# Patient Record
Sex: FEMALE | Race: WHITE | NOT HISPANIC OR LATINO | URBAN - METROPOLITAN AREA
[De-identification: names, ages, dates, MRNs, and addresses within clinical notes are randomized per-mention and may not be internally consistent; named-entity substitution may affect disease eponyms.]

---

## 2018-01-30 ENCOUNTER — INPATIENT (INPATIENT)
Facility: HOSPITAL | Age: 83
LOS: 3 days | Discharge: HOME | DRG: 195 | End: 2018-02-03
Attending: GENERAL PRACTICE | Admitting: GENERAL PRACTICE
Payer: COMMERCIAL

## 2018-01-30 VITALS
SYSTOLIC BLOOD PRESSURE: 158 MMHG | DIASTOLIC BLOOD PRESSURE: 82 MMHG | TEMPERATURE: 101 F | HEIGHT: 64 IN | OXYGEN SATURATION: 100 % | RESPIRATION RATE: 18 BRPM | WEIGHT: 154.98 LBS | HEART RATE: 82 BPM

## 2018-01-30 DIAGNOSIS — I10 ESSENTIAL (PRIMARY) HYPERTENSION: ICD-10-CM

## 2018-01-30 DIAGNOSIS — J18.9 PNEUMONIA, UNSPECIFIED ORGANISM: ICD-10-CM

## 2018-01-30 DIAGNOSIS — Z29.9 ENCOUNTER FOR PROPHYLACTIC MEASURES, UNSPECIFIED: ICD-10-CM

## 2018-01-30 LAB
ANION GAP SERPL CALC-SCNC: 5 MMOL/L — SIGNIFICANT CHANGE UP (ref 5–17)
BASOPHILS # BLD AUTO: 0.1 K/UL — SIGNIFICANT CHANGE UP (ref 0–0.2)
BASOPHILS NFR BLD AUTO: 1.2 % — SIGNIFICANT CHANGE UP (ref 0–2)
BUN SERPL-MCNC: 27 MG/DL — HIGH (ref 7–18)
CALCIUM SERPL-MCNC: 8.8 MG/DL — SIGNIFICANT CHANGE UP (ref 8.4–10.5)
CHLORIDE SERPL-SCNC: 98 MMOL/L — SIGNIFICANT CHANGE UP (ref 96–108)
CO2 SERPL-SCNC: 31 MMOL/L — SIGNIFICANT CHANGE UP (ref 22–31)
CREAT SERPL-MCNC: 1.04 MG/DL — SIGNIFICANT CHANGE UP (ref 0.5–1.3)
EOSINOPHIL # BLD AUTO: 0 K/UL — SIGNIFICANT CHANGE UP (ref 0–0.5)
EOSINOPHIL NFR BLD AUTO: 0.1 % — SIGNIFICANT CHANGE UP (ref 0–6)
GLUCOSE SERPL-MCNC: 160 MG/DL — HIGH (ref 70–99)
HCT VFR BLD CALC: 47.8 % — HIGH (ref 34.5–45)
HGB BLD-MCNC: 15.7 G/DL — HIGH (ref 11.5–15.5)
LACTATE SERPL-SCNC: 1 MMOL/L — SIGNIFICANT CHANGE UP (ref 0.7–2)
LYMPHOCYTES # BLD AUTO: 1.5 K/UL — SIGNIFICANT CHANGE UP (ref 1–3.3)
LYMPHOCYTES # BLD AUTO: 13.1 % — SIGNIFICANT CHANGE UP (ref 13–44)
MCHC RBC-ENTMCNC: 30.7 PG — SIGNIFICANT CHANGE UP (ref 27–34)
MCHC RBC-ENTMCNC: 32.9 GM/DL — SIGNIFICANT CHANGE UP (ref 32–36)
MCV RBC AUTO: 93.4 FL — SIGNIFICANT CHANGE UP (ref 80–100)
MONOCYTES # BLD AUTO: 1.3 K/UL — HIGH (ref 0–0.9)
MONOCYTES NFR BLD AUTO: 11.2 % — SIGNIFICANT CHANGE UP (ref 2–14)
NEUTROPHILS # BLD AUTO: 8.8 K/UL — HIGH (ref 1.8–7.4)
NEUTROPHILS NFR BLD AUTO: 74.4 % — SIGNIFICANT CHANGE UP (ref 43–77)
PLATELET # BLD AUTO: 155 K/UL — SIGNIFICANT CHANGE UP (ref 150–400)
POTASSIUM SERPL-MCNC: 3.8 MMOL/L — SIGNIFICANT CHANGE UP (ref 3.5–5.3)
POTASSIUM SERPL-SCNC: 3.8 MMOL/L — SIGNIFICANT CHANGE UP (ref 3.5–5.3)
RBC # BLD: 5.12 M/UL — SIGNIFICANT CHANGE UP (ref 3.8–5.2)
RBC # FLD: 12.4 % — SIGNIFICANT CHANGE UP (ref 10.3–14.5)
SODIUM SERPL-SCNC: 134 MMOL/L — LOW (ref 135–145)
WBC # BLD: 11.8 K/UL — HIGH (ref 3.8–10.5)
WBC # FLD AUTO: 11.8 K/UL — HIGH (ref 3.8–10.5)

## 2018-01-30 PROCEDURE — 99285 EMERGENCY DEPT VISIT HI MDM: CPT

## 2018-01-30 PROCEDURE — 71046 X-RAY EXAM CHEST 2 VIEWS: CPT | Mod: 26

## 2018-01-30 RX ORDER — INSULIN LISPRO 100/ML
VIAL (ML) SUBCUTANEOUS
Qty: 0 | Refills: 0 | Status: DISCONTINUED | OUTPATIENT
Start: 2018-01-30 | End: 2018-02-03

## 2018-01-30 RX ORDER — SODIUM CHLORIDE 9 MG/ML
1000 INJECTION INTRAMUSCULAR; INTRAVENOUS; SUBCUTANEOUS
Qty: 0 | Refills: 0 | Status: DISCONTINUED | OUTPATIENT
Start: 2018-01-30 | End: 2018-02-03

## 2018-01-30 RX ORDER — IBUPROFEN 200 MG
1 TABLET ORAL
Qty: 0 | Refills: 0 | COMMUNITY

## 2018-01-30 RX ORDER — CEFTRIAXONE 500 MG/1
1 INJECTION, POWDER, FOR SOLUTION INTRAMUSCULAR; INTRAVENOUS EVERY 24 HOURS
Qty: 0 | Refills: 0 | Status: DISCONTINUED | OUTPATIENT
Start: 2018-01-30 | End: 2018-01-31

## 2018-01-30 RX ORDER — ACETAMINOPHEN 500 MG
650 TABLET ORAL EVERY 6 HOURS
Qty: 0 | Refills: 0 | Status: DISCONTINUED | OUTPATIENT
Start: 2018-01-30 | End: 2018-02-03

## 2018-01-30 RX ORDER — CEFTRIAXONE 500 MG/1
1 INJECTION, POWDER, FOR SOLUTION INTRAMUSCULAR; INTRAVENOUS ONCE
Qty: 0 | Refills: 0 | Status: COMPLETED | OUTPATIENT
Start: 2018-01-30 | End: 2018-01-30

## 2018-01-30 RX ORDER — METOPROLOL TARTRATE 50 MG
1 TABLET ORAL
Qty: 0 | Refills: 0 | COMMUNITY

## 2018-01-30 RX ORDER — ALBUTEROL 90 UG/1
1 AEROSOL, METERED ORAL EVERY 4 HOURS
Qty: 0 | Refills: 0 | Status: DISCONTINUED | OUTPATIENT
Start: 2018-01-30 | End: 2018-02-03

## 2018-01-30 RX ORDER — LISINOPRIL 2.5 MG/1
20 TABLET ORAL
Qty: 0 | Refills: 0 | Status: DISCONTINUED | OUTPATIENT
Start: 2018-01-30 | End: 2018-02-03

## 2018-01-30 RX ORDER — IBUPROFEN 200 MG
200 TABLET ORAL EVERY 6 HOURS
Qty: 0 | Refills: 0 | Status: DISCONTINUED | OUTPATIENT
Start: 2018-01-30 | End: 2018-02-03

## 2018-01-30 RX ORDER — TRAMADOL HYDROCHLORIDE 50 MG/1
1 TABLET ORAL
Qty: 0 | Refills: 0 | COMMUNITY

## 2018-01-30 RX ORDER — PANTOPRAZOLE SODIUM 20 MG/1
40 TABLET, DELAYED RELEASE ORAL
Qty: 0 | Refills: 0 | Status: DISCONTINUED | OUTPATIENT
Start: 2018-01-30 | End: 2018-02-03

## 2018-01-30 RX ORDER — BENZOCAINE AND MENTHOL 5; 1 G/100ML; G/100ML
1 LIQUID ORAL EVERY 4 HOURS
Qty: 0 | Refills: 0 | Status: DISCONTINUED | OUTPATIENT
Start: 2018-01-30 | End: 2018-02-03

## 2018-01-30 RX ORDER — AZITHROMYCIN 500 MG/1
500 TABLET, FILM COATED ORAL EVERY 24 HOURS
Qty: 0 | Refills: 0 | Status: DISCONTINUED | OUTPATIENT
Start: 2018-01-30 | End: 2018-02-03

## 2018-01-30 RX ORDER — ACETAMINOPHEN 500 MG
650 TABLET ORAL ONCE
Qty: 0 | Refills: 0 | Status: COMPLETED | OUTPATIENT
Start: 2018-01-30 | End: 2018-01-30

## 2018-01-30 RX ORDER — TRAMADOL HYDROCHLORIDE 50 MG/1
50 TABLET ORAL THREE TIMES A DAY
Qty: 0 | Refills: 0 | Status: DISCONTINUED | OUTPATIENT
Start: 2018-01-30 | End: 2018-02-03

## 2018-01-30 RX ORDER — AZITHROMYCIN 500 MG/1
500 TABLET, FILM COATED ORAL ONCE
Qty: 0 | Refills: 0 | Status: COMPLETED | OUTPATIENT
Start: 2018-01-30 | End: 2018-01-30

## 2018-01-30 RX ORDER — ENOXAPARIN SODIUM 100 MG/ML
40 INJECTION SUBCUTANEOUS DAILY
Qty: 0 | Refills: 0 | Status: DISCONTINUED | OUTPATIENT
Start: 2018-01-30 | End: 2018-02-03

## 2018-01-30 RX ORDER — TIOTROPIUM BROMIDE 18 UG/1
1 CAPSULE ORAL; RESPIRATORY (INHALATION) DAILY
Qty: 0 | Refills: 0 | Status: DISCONTINUED | OUTPATIENT
Start: 2018-01-30 | End: 2018-02-03

## 2018-01-30 RX ORDER — IPRATROPIUM/ALBUTEROL SULFATE 18-103MCG
3 AEROSOL WITH ADAPTER (GRAM) INHALATION EVERY 6 HOURS
Qty: 0 | Refills: 0 | Status: DISCONTINUED | OUTPATIENT
Start: 2018-01-30 | End: 2018-02-03

## 2018-01-30 RX ORDER — SODIUM CHLORIDE 9 MG/ML
1000 INJECTION INTRAMUSCULAR; INTRAVENOUS; SUBCUTANEOUS ONCE
Qty: 0 | Refills: 0 | Status: COMPLETED | OUTPATIENT
Start: 2018-01-30 | End: 2018-01-30

## 2018-01-30 RX ORDER — LISINOPRIL 2.5 MG/1
1 TABLET ORAL
Qty: 0 | Refills: 0 | COMMUNITY

## 2018-01-30 RX ORDER — METOPROLOL TARTRATE 50 MG
100 TABLET ORAL DAILY
Qty: 0 | Refills: 0 | Status: DISCONTINUED | OUTPATIENT
Start: 2018-01-30 | End: 2018-02-03

## 2018-01-30 RX ADMIN — Medication 650 MILLIGRAM(S): at 22:35

## 2018-01-30 RX ADMIN — SODIUM CHLORIDE 1000 MILLILITER(S): 9 INJECTION INTRAMUSCULAR; INTRAVENOUS; SUBCUTANEOUS at 22:34

## 2018-01-30 RX ADMIN — AZITHROMYCIN 250 MILLIGRAM(S): 500 TABLET, FILM COATED ORAL at 22:36

## 2018-01-30 NOTE — H&P ADULT - HISTORY OF PRESENT ILLNESS
Pt offered official  but prefers daughter translate (Waldemar).   Patient is a 83 y/o F pt with PMhx of HTN, arthritis, lichen planus in foor (uses topical steroids, daughter does not know the name of ointment, will bring in the tube tomorrow), lives with daughter and walks with a walker, AAO*3 at baseline,  presents to ED c/o flu-like symptoms since yesterday. She c/o subjective fever, non productive cough, bodyaches, flushed face, dry mouth, gen bodyaches, weakness, chills. Pt has sick contact in daughter also w/ flu-like symptoms since last week. Jessetr says that yesterday patient was so week, and almost fell to the ground and daughter had to lift her up. has had poor appetite sicne yesterday, ROS negative except above  NKDA, Fhx of lung Ca in brother, Surgical Hx of B/L TKR, never smoker/drinker  [facility] d/w daughter at [facility], patient is fullcode Pt offered official  but prefers daughter translate (Waldemar).   Patient is a 83 y/o F pt with PMhx of HTN, arthritis, lichen planus in foor (uses topical steroids, daughter does not know the name of ointment, will bring in the tube tomorrow), lives with daughter and walks with a walker, AAO*3 at baseline,  presents to ED c/o flu-like symptoms since yesterday. She c/o subjective fever, non productive cough, bodyaches, flushed face, dry mouth, gen bodyaches, weakness, chills. Pt has sick contact in daughter also w/ flu-like symptoms since last week. Jessetr says that yesterday patient was so week, and almost fell to the ground and daughter had to lift her up. has had poor appetite sicne yesterday, ROS negative except above  NKDA, Fhx of lung Ca in brother, Surgical Hx of B/L TKR, never smoker/drinker  [facility] d/w daughter at [facility], patient is full code Pt offered official  but prefers daughter translate (Waldemar).   Patient is a 83 y/o F pt with PMhx of HTN, arthritis, lichen planus in foor (uses topical steroids, daughter does not know the name of ointment, will bring in the tube tomorrow), lives with daughter and walks with a walker, AAO*3 at baseline,  presents to ED c/o flu-like symptoms since yesterday. She c/o subjective fever, non productive cough, bodyaches, flushed face, dry mouth, gen bodyaches, weakness, chills. Pt has sick contact in daughter also w/ flu-like symptoms since last week. Jessetr says that yesterday patient was so week, and almost fell to the ground and daughter had to lift her up. has had poor appetite sicne yesterday, Did not take flu shot.   ROS negative except above  NKDA, Fhx of lung Ca in brother, Surgical Hx of B/L TKR, never smoker/drinker  [facility] d/w daughter at [facility], patient is full code

## 2018-01-30 NOTE — H&P ADULT - PROBLEM SELECTOR PLAN 1
Possible infiltrate on CXR however pending official report, will get CT Chest  s/p ceftriaxone+azithromycin+1L NS bolus in the ED, will c/w Abx, maintenance IVF, Lactate is WNL, need to send RVP, will give 1 dose of tamiflu before RVP is resulted  Patient has diffuse expiratory wheezing on exam  O2, nebs, Robitussin, Cepacol

## 2018-01-30 NOTE — ED PROVIDER NOTE - PROGRESS NOTE DETAILS
CXR with ?infiltrate.  pt reassessed, still feeling weak.  will admit for further treatment.  Blood cultures taken prior to abx.  pt treated for community acquired pneumonia with ceftriaxone and zithromax.

## 2018-01-30 NOTE — H&P ADULT - ASSESSMENT
Patient is a 83 y/o F pt with PMhx of HTN, arthritis, lichen planus in foor (uses topical steroids, daughter does not know the name of ointment, will bring in the tube tomorrow), lives with daughter and walks with a walker, AAO*3 at baseline,  presents to ED c/o flu-like symptoms since yesterday.    Patient has a low grade fever of 100.7 in the ED, BP: 158/82, RR status stable, able to speak in full sentences and O2 sat 100%, Has diffuse expiratory wheezing on exam. no leucocytosis, however hemoconcentrated, H/h of 15.7/47.8, mild hyponatremia of 134, elevated BUN of 27, elevated BS: 160, alctate WNL, s/p ceftriaxone+azithromycin in the ED and 1 L NS bolus. CXR, pending official read, questionable RLL infiltrate  Admitted for flu like symptoms, RVP pending, v/s PNA

## 2018-01-30 NOTE — H&P ADULT - NSHPPHYSICALEXAM_GEN_ALL_CORE
GENERAL: elderly female in no acute distress, comfortable  HEAD: atraumatic, normocephalic   EYES: PERRLA, white sclera.   ENMT: nasal mucosa- Oral cavity- dry  NECK: supple, no JVD, thyroid- not palpable  LYMPH: no palpable lymph nodes     SKIN:  warm and dry  CHEST/LUNG: diffuse expiratory wheezing  HEART: RRR, no m/r/g   ABDOMEN: soft, nontender, nondistended; bowel sounds+  EXTREMITIES: No pedal edema, mild ankle puffiness no cyanosis, no clubbing.  NEURO: AA0X3 , mood/ affect-stable  , no focal neuro deficits

## 2018-01-30 NOTE — ED PROVIDER NOTE - OBJECTIVE STATEMENT
Pt offered official  but prefers daughter translate (Spanish). 81 y/o F pt w/ no significant PMHx presents to ED c/o flu-like symptoms since yesterday. Pt has sick contact in daughter also w/ flu-like symptoms since last week. In ED pt w/ fever, cough and myalgias. NKDA.

## 2018-01-30 NOTE — ED ADULT NURSE NOTE - ED STAT RN HANDOFF DETAILS 2
report given to FARRUKH Finch in holding area in stable condition for continuation of care. pt a&ox3, admitted for PNA. droplet isolation for flu. 20G LAC.

## 2018-01-31 LAB
24R-OH-CALCIDIOL SERPL-MCNC: 20.9 NG/ML — LOW (ref 30–80)
ALBUMIN SERPL ELPH-MCNC: 3.3 G/DL — LOW (ref 3.5–5)
ALP SERPL-CCNC: 41 U/L — SIGNIFICANT CHANGE UP (ref 40–120)
ALT FLD-CCNC: 16 U/L DA — SIGNIFICANT CHANGE UP (ref 10–60)
ANION GAP SERPL CALC-SCNC: 11 MMOL/L — SIGNIFICANT CHANGE UP (ref 5–17)
AST SERPL-CCNC: 25 U/L — SIGNIFICANT CHANGE UP (ref 10–40)
BASOPHILS # BLD AUTO: 0.1 K/UL — SIGNIFICANT CHANGE UP (ref 0–0.2)
BASOPHILS NFR BLD AUTO: 1.1 % — SIGNIFICANT CHANGE UP (ref 0–2)
BILIRUB SERPL-MCNC: 0.4 MG/DL — SIGNIFICANT CHANGE UP (ref 0.2–1.2)
BUN SERPL-MCNC: 21 MG/DL — HIGH (ref 7–18)
CALCIUM SERPL-MCNC: 8.5 MG/DL — SIGNIFICANT CHANGE UP (ref 8.4–10.5)
CHLORIDE SERPL-SCNC: 100 MMOL/L — SIGNIFICANT CHANGE UP (ref 96–108)
CHOLEST SERPL-MCNC: 153 MG/DL — SIGNIFICANT CHANGE UP (ref 10–199)
CO2 SERPL-SCNC: 26 MMOL/L — SIGNIFICANT CHANGE UP (ref 22–31)
CREAT SERPL-MCNC: 0.89 MG/DL — SIGNIFICANT CHANGE UP (ref 0.5–1.3)
EOSINOPHIL # BLD AUTO: 0 K/UL — SIGNIFICANT CHANGE UP (ref 0–0.5)
EOSINOPHIL NFR BLD AUTO: 0.2 % — SIGNIFICANT CHANGE UP (ref 0–6)
FLUAV H1 2009 PAND RNA SPEC QL NAA+PROBE: DETECTED
FOLATE SERPL-MCNC: 6.2 NG/ML — SIGNIFICANT CHANGE UP (ref 4.8–24.2)
GLUCOSE BLDC GLUCOMTR-MCNC: 101 MG/DL — HIGH (ref 70–99)
GLUCOSE BLDC GLUCOMTR-MCNC: 123 MG/DL — HIGH (ref 70–99)
GLUCOSE BLDC GLUCOMTR-MCNC: 125 MG/DL — HIGH (ref 70–99)
GLUCOSE BLDC GLUCOMTR-MCNC: 138 MG/DL — HIGH (ref 70–99)
GLUCOSE SERPL-MCNC: 109 MG/DL — HIGH (ref 70–99)
HBA1C BLD-MCNC: 6.6 % — HIGH (ref 4–5.6)
HCT VFR BLD CALC: 47.2 % — HIGH (ref 34.5–45)
HDLC SERPL-MCNC: 28 MG/DL — LOW (ref 40–125)
HGB BLD-MCNC: 15.2 G/DL — SIGNIFICANT CHANGE UP (ref 11.5–15.5)
LIPID PNL WITH DIRECT LDL SERPL: 93 MG/DL — SIGNIFICANT CHANGE UP
LYMPHOCYTES # BLD AUTO: 2.6 K/UL — SIGNIFICANT CHANGE UP (ref 1–3.3)
LYMPHOCYTES # BLD AUTO: 25.6 % — SIGNIFICANT CHANGE UP (ref 13–44)
MAGNESIUM SERPL-MCNC: 1.9 MG/DL — SIGNIFICANT CHANGE UP (ref 1.6–2.6)
MCHC RBC-ENTMCNC: 30.4 PG — SIGNIFICANT CHANGE UP (ref 27–34)
MCHC RBC-ENTMCNC: 32.3 GM/DL — SIGNIFICANT CHANGE UP (ref 32–36)
MCV RBC AUTO: 94.2 FL — SIGNIFICANT CHANGE UP (ref 80–100)
MONOCYTES # BLD AUTO: 1.2 K/UL — HIGH (ref 0–0.9)
MONOCYTES NFR BLD AUTO: 12 % — SIGNIFICANT CHANGE UP (ref 2–14)
NEUTROPHILS # BLD AUTO: 6.1 K/UL — SIGNIFICANT CHANGE UP (ref 1.8–7.4)
NEUTROPHILS NFR BLD AUTO: 61.1 % — SIGNIFICANT CHANGE UP (ref 43–77)
PHOSPHATE SERPL-MCNC: 3.2 MG/DL — SIGNIFICANT CHANGE UP (ref 2.5–4.5)
PLATELET # BLD AUTO: 162 K/UL — SIGNIFICANT CHANGE UP (ref 150–400)
POTASSIUM SERPL-MCNC: 3.9 MMOL/L — SIGNIFICANT CHANGE UP (ref 3.5–5.3)
POTASSIUM SERPL-SCNC: 3.9 MMOL/L — SIGNIFICANT CHANGE UP (ref 3.5–5.3)
PROT SERPL-MCNC: 8.2 G/DL — SIGNIFICANT CHANGE UP (ref 6–8.3)
RAPID RVP RESULT: DETECTED
RBC # BLD: 5 M/UL — SIGNIFICANT CHANGE UP (ref 3.8–5.2)
RBC # FLD: 12.3 % — SIGNIFICANT CHANGE UP (ref 10.3–14.5)
SODIUM SERPL-SCNC: 137 MMOL/L — SIGNIFICANT CHANGE UP (ref 135–145)
TOTAL CHOLESTEROL/HDL RATIO MEASUREMENT: 5.5 RATIO — SIGNIFICANT CHANGE UP (ref 3.3–7.1)
TRIGL SERPL-MCNC: 162 MG/DL — HIGH (ref 10–149)
TSH SERPL-MCNC: 2.65 UU/ML — SIGNIFICANT CHANGE UP (ref 0.34–4.82)
VIT B12 SERPL-MCNC: 390 PG/ML — SIGNIFICANT CHANGE UP (ref 232–1245)
WBC # BLD: 10 K/UL — SIGNIFICANT CHANGE UP (ref 3.8–10.5)
WBC # FLD AUTO: 10 K/UL — SIGNIFICANT CHANGE UP (ref 3.8–10.5)

## 2018-01-31 PROCEDURE — 71250 CT THORAX DX C-: CPT | Mod: 26

## 2018-01-31 RX ORDER — CHOLECALCIFEROL (VITAMIN D3) 125 MCG
1000 CAPSULE ORAL DAILY
Qty: 0 | Refills: 0 | Status: DISCONTINUED | OUTPATIENT
Start: 2018-01-31 | End: 2018-02-03

## 2018-01-31 RX ORDER — AMLODIPINE BESYLATE 2.5 MG/1
5 TABLET ORAL DAILY
Qty: 0 | Refills: 0 | Status: DISCONTINUED | OUTPATIENT
Start: 2018-01-31 | End: 2018-02-01

## 2018-01-31 RX ORDER — NYSTATIN CREAM 100000 [USP'U]/G
1 CREAM TOPICAL THREE TIMES A DAY
Qty: 0 | Refills: 0 | Status: DISCONTINUED | OUTPATIENT
Start: 2018-01-31 | End: 2018-02-03

## 2018-01-31 RX ADMIN — Medication 0.1 MILLIGRAM(S): at 05:14

## 2018-01-31 RX ADMIN — Medication 1 APPLICATION(S): at 12:39

## 2018-01-31 RX ADMIN — AMLODIPINE BESYLATE 5 MILLIGRAM(S): 2.5 TABLET ORAL at 22:25

## 2018-01-31 RX ADMIN — Medication 3 MILLILITER(S): at 10:13

## 2018-01-31 RX ADMIN — CEFTRIAXONE 100 GRAM(S): 500 INJECTION, POWDER, FOR SOLUTION INTRAMUSCULAR; INTRAVENOUS at 01:18

## 2018-01-31 RX ADMIN — Medication 30 MILLIGRAM(S): at 05:19

## 2018-01-31 RX ADMIN — Medication 100 MILLIGRAM(S): at 05:53

## 2018-01-31 RX ADMIN — PANTOPRAZOLE SODIUM 40 MILLIGRAM(S): 20 TABLET, DELAYED RELEASE ORAL at 07:58

## 2018-01-31 RX ADMIN — AZITHROMYCIN 250 MILLIGRAM(S): 500 TABLET, FILM COATED ORAL at 22:30

## 2018-01-31 RX ADMIN — ENOXAPARIN SODIUM 40 MILLIGRAM(S): 100 INJECTION SUBCUTANEOUS at 12:39

## 2018-01-31 RX ADMIN — Medication 75 MILLIGRAM(S): at 01:16

## 2018-01-31 RX ADMIN — Medication 30 MILLIGRAM(S): at 18:02

## 2018-01-31 RX ADMIN — LISINOPRIL 20 MILLIGRAM(S): 2.5 TABLET ORAL at 18:03

## 2018-01-31 RX ADMIN — Medication 3 MILLILITER(S): at 05:13

## 2018-01-31 RX ADMIN — Medication 3 MILLILITER(S): at 20:14

## 2018-01-31 RX ADMIN — Medication 0.1 MILLIGRAM(S): at 18:03

## 2018-01-31 RX ADMIN — NYSTATIN CREAM 1 APPLICATION(S): 100000 CREAM TOPICAL at 22:25

## 2018-01-31 RX ADMIN — LISINOPRIL 20 MILLIGRAM(S): 2.5 TABLET ORAL at 05:14

## 2018-01-31 RX ADMIN — Medication 3 MILLILITER(S): at 15:16

## 2018-02-01 ENCOUNTER — TRANSCRIPTION ENCOUNTER (OUTPATIENT)
Age: 83
End: 2018-02-01

## 2018-02-01 LAB
ALBUMIN SERPL ELPH-MCNC: 3.1 G/DL — LOW (ref 3.5–5)
ALP SERPL-CCNC: 66 U/L — SIGNIFICANT CHANGE UP (ref 40–120)
ALT FLD-CCNC: 34 U/L DA — SIGNIFICANT CHANGE UP (ref 10–60)
ANION GAP SERPL CALC-SCNC: 8 MMOL/L — SIGNIFICANT CHANGE UP (ref 5–17)
AST SERPL-CCNC: 43 U/L — HIGH (ref 10–40)
BILIRUB SERPL-MCNC: 0.5 MG/DL — SIGNIFICANT CHANGE UP (ref 0.2–1.2)
BUN SERPL-MCNC: 20 MG/DL — HIGH (ref 7–18)
CALCIUM SERPL-MCNC: 8.9 MG/DL — SIGNIFICANT CHANGE UP (ref 8.4–10.5)
CHLORIDE SERPL-SCNC: 101 MMOL/L — SIGNIFICANT CHANGE UP (ref 96–108)
CO2 SERPL-SCNC: 28 MMOL/L — SIGNIFICANT CHANGE UP (ref 22–31)
CREAT SERPL-MCNC: 0.85 MG/DL — SIGNIFICANT CHANGE UP (ref 0.5–1.3)
GLUCOSE BLDC GLUCOMTR-MCNC: 106 MG/DL — HIGH (ref 70–99)
GLUCOSE BLDC GLUCOMTR-MCNC: 123 MG/DL — HIGH (ref 70–99)
GLUCOSE BLDC GLUCOMTR-MCNC: 134 MG/DL — HIGH (ref 70–99)
GLUCOSE BLDC GLUCOMTR-MCNC: 175 MG/DL — HIGH (ref 70–99)
GLUCOSE SERPL-MCNC: 109 MG/DL — HIGH (ref 70–99)
HCT VFR BLD CALC: 47.4 % — HIGH (ref 34.5–45)
HGB BLD-MCNC: 15.2 G/DL — SIGNIFICANT CHANGE UP (ref 11.5–15.5)
MCHC RBC-ENTMCNC: 29.9 PG — SIGNIFICANT CHANGE UP (ref 27–34)
MCHC RBC-ENTMCNC: 32.1 GM/DL — SIGNIFICANT CHANGE UP (ref 32–36)
MCV RBC AUTO: 93.3 FL — SIGNIFICANT CHANGE UP (ref 80–100)
PLATELET # BLD AUTO: 143 K/UL — LOW (ref 150–400)
POTASSIUM SERPL-MCNC: 4 MMOL/L — SIGNIFICANT CHANGE UP (ref 3.5–5.3)
POTASSIUM SERPL-SCNC: 4 MMOL/L — SIGNIFICANT CHANGE UP (ref 3.5–5.3)
PROT SERPL-MCNC: 8.1 G/DL — SIGNIFICANT CHANGE UP (ref 6–8.3)
RBC # BLD: 5.08 M/UL — SIGNIFICANT CHANGE UP (ref 3.8–5.2)
RBC # FLD: 12.3 % — SIGNIFICANT CHANGE UP (ref 10.3–14.5)
SODIUM SERPL-SCNC: 137 MMOL/L — SIGNIFICANT CHANGE UP (ref 135–145)
WBC # BLD: 6.4 K/UL — SIGNIFICANT CHANGE UP (ref 3.8–10.5)
WBC # FLD AUTO: 6.4 K/UL — SIGNIFICANT CHANGE UP (ref 3.8–10.5)

## 2018-02-01 RX ORDER — AMLODIPINE BESYLATE 2.5 MG/1
10 TABLET ORAL DAILY
Qty: 0 | Refills: 0 | Status: DISCONTINUED | OUTPATIENT
Start: 2018-02-02 | End: 2018-02-03

## 2018-02-01 RX ORDER — AMLODIPINE BESYLATE 2.5 MG/1
5 TABLET ORAL ONCE
Qty: 0 | Refills: 0 | Status: COMPLETED | OUTPATIENT
Start: 2018-02-01 | End: 2018-02-01

## 2018-02-01 RX ADMIN — Medication 0.1 MILLIGRAM(S): at 18:50

## 2018-02-01 RX ADMIN — NYSTATIN CREAM 1 APPLICATION(S): 100000 CREAM TOPICAL at 14:51

## 2018-02-01 RX ADMIN — Medication 30 MILLIGRAM(S): at 06:18

## 2018-02-01 RX ADMIN — Medication 3 MILLILITER(S): at 20:53

## 2018-02-01 RX ADMIN — Medication 100 MILLIGRAM(S): at 18:52

## 2018-02-01 RX ADMIN — Medication 0.1 MILLIGRAM(S): at 06:18

## 2018-02-01 RX ADMIN — NYSTATIN CREAM 1 APPLICATION(S): 100000 CREAM TOPICAL at 23:13

## 2018-02-01 RX ADMIN — Medication 100 MILLIGRAM(S): at 06:18

## 2018-02-01 RX ADMIN — Medication 1000 UNIT(S): at 12:42

## 2018-02-01 RX ADMIN — Medication 30 MILLIGRAM(S): at 18:50

## 2018-02-01 RX ADMIN — LISINOPRIL 20 MILLIGRAM(S): 2.5 TABLET ORAL at 18:50

## 2018-02-01 RX ADMIN — LISINOPRIL 20 MILLIGRAM(S): 2.5 TABLET ORAL at 06:18

## 2018-02-01 RX ADMIN — Medication 3 MILLILITER(S): at 09:07

## 2018-02-01 RX ADMIN — ENOXAPARIN SODIUM 40 MILLIGRAM(S): 100 INJECTION SUBCUTANEOUS at 12:42

## 2018-02-01 RX ADMIN — AZITHROMYCIN 250 MILLIGRAM(S): 500 TABLET, FILM COATED ORAL at 23:12

## 2018-02-01 RX ADMIN — PANTOPRAZOLE SODIUM 40 MILLIGRAM(S): 20 TABLET, DELAYED RELEASE ORAL at 06:18

## 2018-02-01 RX ADMIN — AMLODIPINE BESYLATE 5 MILLIGRAM(S): 2.5 TABLET ORAL at 01:58

## 2018-02-01 RX ADMIN — Medication 3 MILLILITER(S): at 15:18

## 2018-02-01 RX ADMIN — Medication 1 APPLICATION(S): at 18:50

## 2018-02-01 RX ADMIN — NYSTATIN CREAM 1 APPLICATION(S): 100000 CREAM TOPICAL at 06:19

## 2018-02-01 NOTE — PROGRESS NOTE ADULT - ASSESSMENT
Patient is a 81 y/o F pt with PMhx of HTN, arthritis, lichen planus in foot (uses topical steroids, daughter does not know the name of ointment, will bring in the tube tomorrow), lives with daughter and walks with a walker, AAO*3 at baseline,  presents to ED c/o flu-like symptoms.  Admitted for the flu and possible early PNA     Problem/Plan - 1:  ·  Problem: viral URI / influenza with possible early bacterial superinfection/ Pneumonia  continue Tamiflu and azithromycin day 3  O2, nebs, Robitussin, Cepacol.   supportive care     Problem/Plan - 2:  ·  Problem: Hypertension.  Plan: c/w home dose of clonidine, Torpol XL and lisinopril with parameters  DASH diet.     -GI/DVT Prophylaxis.

## 2018-02-01 NOTE — DISCHARGE NOTE ADULT - CARE PLAN
Principal Discharge DX:	Influenza  Goal:	to treat it  Assessment and plan of treatment:	continue Tamiflu for total 5 days and pt treated with azithromycin   encourage oral hydration  f/u with PMD within 1 week  Secondary Diagnosis:	Essential hypertension Principal Discharge DX:	Influenza  Goal:	to treat it  Assessment and plan of treatment:	You have been diagnosed with Flu, You were given Tamiflu in hospital. Continue tamiflu for 2 more days and then stop. Please follow up with your Primary medical doctor    encourage oral hydration  f/u with PMD within 1 week  Secondary Diagnosis:	Essential hypertension  Goal:	<140/80  Assessment and plan of treatment:	Continue with your BP medications at home. adhere to low salt diet. Please follow up with your Primary medical doctor  Secondary Diagnosis:	Pneumonia  Assessment and plan of treatment:	You have been treated for early pneumonia with antibiotic. Continue antibiotic, azithromycin for 2 more days and then stop. Please follow up with your Primary medical doctor Principal Discharge DX:	Influenza  Goal:	to treat it  Assessment and plan of treatment:	You have been diagnosed with Flu, You were given Tamiflu in hospital. Continue tamiflu for 2 more days and then stop. Please follow up with your Primary medical doctor    encourage oral hydration  f/u with PMD within 1 week  Secondary Diagnosis:	Essential hypertension  Goal:	<140/80  Assessment and plan of treatment:	Continue with your BP medications at home. adhere to low salt diet. Please follow up with your Primary medical doctor  Secondary Diagnosis:	Pneumonia  Assessment and plan of treatment:	You have been treated for early pneumonia with antibiotic. Continue antibiotic, azithromycin for 2 more days and then stop. Please follow up with your Primary medical doctor  Secondary Diagnosis:	Wound  Assessment and plan of treatment:	Please do wound care at home and apply nystatin powder to the rash.

## 2018-02-01 NOTE — DISCHARGE NOTE ADULT - PATIENT PORTAL LINK FT
“You can access the [employer] Patient Portal, offered by [facility], by registering with the following website: [email or web address]”

## 2018-02-01 NOTE — DISCHARGE NOTE ADULT - MEDICATION SUMMARY - MEDICATIONS TO TAKE
I will START or STAY ON the medications listed below when I get home from the hospital:    Rolling walker  -- 1   -- Indication: For ambulation    rolling walker  -- Indication: For ambulation    traMADol 50 mg oral tablet  -- 1 tab(s) by mouth 3 times a day  -- Indication: For analgesic    Advil 200 mg oral tablet  -- 1 tab(s) by mouth every 6 hours  -- Indication: For analgesic    lisinopril 20 mg oral tablet  -- 1 tab(s) by mouth 2 times a day  -- Indication: For HTN    cloNIDine 0.1 mg oral tablet  -- 1 tab(s) by mouth 2 times a day  -- Indication: For HTN    oseltamivir 30 mg oral capsule  -- 1 cap(s) by mouth 2 times a day  -- Indication: For FLU    metoprolol succinate 100 mg oral tablet, extended release  -- 1 tab(s) by mouth once a day  -- Indication: For HTN    Zithromax 500 mg oral tablet  -- 1 tab(s) by mouth once a day (after a meal)   -- Do not take dairy products, antacids, or iron preparations within one hour of this medication.  Finish all this medication unless otherwise directed by prescriber.    -- Indication: For PnA I will START or STAY ON the medications listed below when I get home from the hospital:    Rolling walker  -- 1   -- Indication: For ambulation    rolling walker  -- Indication: For ambulation    traMADol 50 mg oral tablet  -- 1 tab(s) by mouth 3 times a day  -- Indication: For analgesic    Advil 200 mg oral tablet  -- 1 tab(s) by mouth every 6 hours  -- Indication: For analgesic    lisinopril 20 mg oral tablet  -- 1 tab(s) by mouth 2 times a day  -- Indication: For HTN    cloNIDine 0.1 mg oral tablet  -- 1 tab(s) by mouth 2 times a day  -- Indication: For Essential hypertension    oseltamivir 30 mg oral capsule  -- 1 cap(s) by mouth 2 times a day  -- Indication: For Influenza    metoprolol succinate 100 mg oral tablet, extended release  -- 1 tab(s) by mouth once a day  -- Indication: For HTN    Zithromax 500 mg oral tablet  -- 1 tab(s) by mouth once a day (after a meal)   -- Do not take dairy products, antacids, or iron preparations within one hour of this medication.  Finish all this medication unless otherwise directed by prescriber.    -- Indication: For Pneumonia

## 2018-02-01 NOTE — DISCHARGE NOTE ADULT - PLAN OF CARE
to treat it continue Tamiflu for total 5 days and pt treated with azithromycin   encourage oral hydration  f/u with PMD within 1 week You have been diagnosed with Flu, You were given Tamiflu in hospital. Continue tamiflu for 2 more days and then stop. Please follow up with your Primary medical doctor    encourage oral hydration  f/u with PMD within 1 week <140/80 Continue with your BP medications at home. adhere to low salt diet. Please follow up with your Primary medical doctor You have been treated for early pneumonia with antibiotic. Continue antibiotic, azithromycin for 2 more days and then stop. Please follow up with your Primary medical doctor Please do wound care at home and apply nystatin powder to the rash.

## 2018-02-01 NOTE — DISCHARGE NOTE ADULT - HOSPITAL COURSE
Patient is a 81 y/o F pt with PMhx of HTN, arthritis, lichen planus in foot (uses topical steroids, daughter does not know the name of ointment, will bring in the tube tomorrow), lives with daughter and walks with a walker, AAO*3 at baseline,  presents to ED c/o flu-like symptoms.  Admitted for the flu and possible early PNA  1. Viral URI / influenza with possible early bacterial superinfection/ Pneumonia  continue Tamiflu for total 5 days and pt treated with azithromycin   O2, nebs, Robitussin, Cepacol.   supportive care and encourage oral hydration    2. Hypertension.: c/w home dose of clonidine, Torpol XL and lisinopril   DASH diet.     Pt is stable to be discharged as per Attending Discharge summary by covering resident:     Patient is a 83 y/o F pt with PMhx of HTN, arthritis, lichen planus in foot (uses topical steroids, daughter does not know the name of ointment, will bring in the tube tomorrow), lives with daughter and walks with a walker, AAO*3 at baseline,  presents to ED c/o flu-like symptoms.  Admitted for the flu and possible early PNA  1. Viral URI / influenza with possible early bacterial superinfection/ Pneumonia  continue Tamiflu for total 5 days and pt treated with azithromycin   O2, nebs, Robitussin, Cepacol.   supportive care and encourage oral hydration    2. Hypertension.: c/w home dose of clonidine, Torpol XL and lisinopril   DASH diet.     Pt is stable to be discharged as per Attending

## 2018-02-01 NOTE — DISCHARGE NOTE ADULT - ADDITIONAL INSTRUCTIONS
continue Tamiflu for total 5 days and pt treated with azithromycin   encourage oral hydration  f/u with PMD within 1 week continue Tamiflu for total 5 days and pt treated with azithromycin , Continue azithromycin for 2 days  encourage oral hydration  f/u with PMD within 1 week

## 2018-02-01 NOTE — DIETITIAN INITIAL EVALUATION ADULT. - OTHER INFO
Nutrition consult requested for assessment & education. Patient from home lives with daughter. Visited pt. debilitated, poor historian, contacted daughter but connected to voice mail presently. Per PCA pt. not eating so well, consumed 40% of breakfast meals & tolerating, also observed tray, encourage po intake with assistance feeding, foot/wound care noted. D/W RN/MD.

## 2018-02-01 NOTE — DIETITIAN INITIAL EVALUATION ADULT. - NUTRITION INTERVENTION
Mineral/Medical Food Supplements/Meals and Snack/Vitamin/Collaboration and Referral of Nutrition Care

## 2018-02-02 LAB
ANION GAP SERPL CALC-SCNC: 11 MMOL/L — SIGNIFICANT CHANGE UP (ref 5–17)
BUN SERPL-MCNC: 36 MG/DL — HIGH (ref 7–18)
CALCIUM SERPL-MCNC: 9.1 MG/DL — SIGNIFICANT CHANGE UP (ref 8.4–10.5)
CHLORIDE SERPL-SCNC: 102 MMOL/L — SIGNIFICANT CHANGE UP (ref 96–108)
CO2 SERPL-SCNC: 26 MMOL/L — SIGNIFICANT CHANGE UP (ref 22–31)
CREAT SERPL-MCNC: 1.11 MG/DL — SIGNIFICANT CHANGE UP (ref 0.5–1.3)
GLUCOSE BLDC GLUCOMTR-MCNC: 115 MG/DL — HIGH (ref 70–99)
GLUCOSE BLDC GLUCOMTR-MCNC: 122 MG/DL — HIGH (ref 70–99)
GLUCOSE BLDC GLUCOMTR-MCNC: 128 MG/DL — HIGH (ref 70–99)
GLUCOSE BLDC GLUCOMTR-MCNC: 206 MG/DL — HIGH (ref 70–99)
GLUCOSE SERPL-MCNC: 107 MG/DL — HIGH (ref 70–99)
HCT VFR BLD CALC: 46.8 % — HIGH (ref 34.5–45)
HGB BLD-MCNC: 15.1 G/DL — SIGNIFICANT CHANGE UP (ref 11.5–15.5)
MAGNESIUM SERPL-MCNC: 2.3 MG/DL — SIGNIFICANT CHANGE UP (ref 1.6–2.6)
MCHC RBC-ENTMCNC: 30.2 PG — SIGNIFICANT CHANGE UP (ref 27–34)
MCHC RBC-ENTMCNC: 32.2 GM/DL — SIGNIFICANT CHANGE UP (ref 32–36)
MCV RBC AUTO: 93.8 FL — SIGNIFICANT CHANGE UP (ref 80–100)
PHOSPHATE SERPL-MCNC: 4.2 MG/DL — SIGNIFICANT CHANGE UP (ref 2.5–4.5)
PLATELET # BLD AUTO: 163 K/UL — SIGNIFICANT CHANGE UP (ref 150–400)
POTASSIUM SERPL-MCNC: 3.6 MMOL/L — SIGNIFICANT CHANGE UP (ref 3.5–5.3)
POTASSIUM SERPL-SCNC: 3.6 MMOL/L — SIGNIFICANT CHANGE UP (ref 3.5–5.3)
RBC # BLD: 4.99 M/UL — SIGNIFICANT CHANGE UP (ref 3.8–5.2)
RBC # FLD: 12.2 % — SIGNIFICANT CHANGE UP (ref 10.3–14.5)
SODIUM SERPL-SCNC: 139 MMOL/L — SIGNIFICANT CHANGE UP (ref 135–145)
WBC # BLD: 6.8 K/UL — SIGNIFICANT CHANGE UP (ref 3.8–10.5)
WBC # FLD AUTO: 6.8 K/UL — SIGNIFICANT CHANGE UP (ref 3.8–10.5)

## 2018-02-02 RX ADMIN — ENOXAPARIN SODIUM 40 MILLIGRAM(S): 100 INJECTION SUBCUTANEOUS at 11:37

## 2018-02-02 RX ADMIN — AMLODIPINE BESYLATE 10 MILLIGRAM(S): 2.5 TABLET ORAL at 06:11

## 2018-02-02 RX ADMIN — NYSTATIN CREAM 1 APPLICATION(S): 100000 CREAM TOPICAL at 21:59

## 2018-02-02 RX ADMIN — Medication 1 APPLICATION(S): at 11:37

## 2018-02-02 RX ADMIN — Medication 100 MILLIGRAM(S): at 21:59

## 2018-02-02 RX ADMIN — Medication 1000 UNIT(S): at 11:37

## 2018-02-02 RX ADMIN — Medication 30 MILLIGRAM(S): at 06:11

## 2018-02-02 RX ADMIN — LISINOPRIL 20 MILLIGRAM(S): 2.5 TABLET ORAL at 17:06

## 2018-02-02 RX ADMIN — Medication 0.1 MILLIGRAM(S): at 17:07

## 2018-02-02 RX ADMIN — LISINOPRIL 20 MILLIGRAM(S): 2.5 TABLET ORAL at 06:40

## 2018-02-02 RX ADMIN — Medication 30 MILLIGRAM(S): at 17:06

## 2018-02-02 RX ADMIN — NYSTATIN CREAM 1 APPLICATION(S): 100000 CREAM TOPICAL at 06:11

## 2018-02-02 RX ADMIN — Medication 100 MILLIGRAM(S): at 06:12

## 2018-02-02 RX ADMIN — PANTOPRAZOLE SODIUM 40 MILLIGRAM(S): 20 TABLET, DELAYED RELEASE ORAL at 06:11

## 2018-02-02 RX ADMIN — Medication 3 MILLILITER(S): at 21:40

## 2018-02-02 RX ADMIN — NYSTATIN CREAM 1 APPLICATION(S): 100000 CREAM TOPICAL at 13:28

## 2018-02-02 RX ADMIN — Medication 3 MILLILITER(S): at 14:40

## 2018-02-02 RX ADMIN — Medication 3 MILLILITER(S): at 09:48

## 2018-02-02 RX ADMIN — AZITHROMYCIN 250 MILLIGRAM(S): 500 TABLET, FILM COATED ORAL at 21:59

## 2018-02-02 RX ADMIN — Medication 0.1 MILLIGRAM(S): at 06:11

## 2018-02-02 RX ADMIN — Medication 2: at 12:16

## 2018-02-02 NOTE — PHYSICAL THERAPY INITIAL EVALUATION ADULT - GENERAL OBSERVATIONS, REHAB EVAL
Pt seen supine in bed w/no lines attached, denied any c/o pain/SOB, was cooperative during assessment. Pt speaks Serbian and  #[id number] assisted with translation

## 2018-02-03 VITALS — DIASTOLIC BLOOD PRESSURE: 71 MMHG | TEMPERATURE: 99 F | SYSTOLIC BLOOD PRESSURE: 105 MMHG | HEART RATE: 89 BPM

## 2018-02-03 LAB
GLUCOSE BLDC GLUCOMTR-MCNC: 126 MG/DL — HIGH (ref 70–99)
GLUCOSE BLDC GLUCOMTR-MCNC: 137 MG/DL — HIGH (ref 70–99)

## 2018-02-03 PROCEDURE — 83735 ASSAY OF MAGNESIUM: CPT

## 2018-02-03 PROCEDURE — 82607 VITAMIN B-12: CPT

## 2018-02-03 PROCEDURE — 84443 ASSAY THYROID STIM HORMONE: CPT

## 2018-02-03 PROCEDURE — 93005 ELECTROCARDIOGRAM TRACING: CPT

## 2018-02-03 PROCEDURE — 80061 LIPID PANEL: CPT

## 2018-02-03 PROCEDURE — 83605 ASSAY OF LACTIC ACID: CPT

## 2018-02-03 PROCEDURE — 80048 BASIC METABOLIC PNL TOTAL CA: CPT

## 2018-02-03 PROCEDURE — 87633 RESP VIRUS 12-25 TARGETS: CPT

## 2018-02-03 PROCEDURE — 87040 BLOOD CULTURE FOR BACTERIA: CPT

## 2018-02-03 PROCEDURE — 71046 X-RAY EXAM CHEST 2 VIEWS: CPT

## 2018-02-03 PROCEDURE — 97162 PT EVAL MOD COMPLEX 30 MIN: CPT

## 2018-02-03 PROCEDURE — 80053 COMPREHEN METABOLIC PANEL: CPT

## 2018-02-03 PROCEDURE — 84100 ASSAY OF PHOSPHORUS: CPT

## 2018-02-03 PROCEDURE — 82962 GLUCOSE BLOOD TEST: CPT

## 2018-02-03 PROCEDURE — 83036 HEMOGLOBIN GLYCOSYLATED A1C: CPT

## 2018-02-03 PROCEDURE — 99285 EMERGENCY DEPT VISIT HI MDM: CPT | Mod: 25

## 2018-02-03 PROCEDURE — 82306 VITAMIN D 25 HYDROXY: CPT

## 2018-02-03 PROCEDURE — 85027 COMPLETE CBC AUTOMATED: CPT

## 2018-02-03 PROCEDURE — 82746 ASSAY OF FOLIC ACID SERUM: CPT

## 2018-02-03 PROCEDURE — 71250 CT THORAX DX C-: CPT

## 2018-02-03 PROCEDURE — 87798 DETECT AGENT NOS DNA AMP: CPT

## 2018-02-03 PROCEDURE — 87581 M.PNEUMON DNA AMP PROBE: CPT

## 2018-02-03 PROCEDURE — 87486 CHLMYD PNEUM DNA AMP PROBE: CPT

## 2018-02-03 PROCEDURE — 94640 AIRWAY INHALATION TREATMENT: CPT

## 2018-02-03 RX ORDER — AZITHROMYCIN 500 MG/1
1 TABLET, FILM COATED ORAL
Qty: 2 | Refills: 0 | OUTPATIENT
Start: 2018-02-03 | End: 2018-02-04

## 2018-02-03 RX ADMIN — AMLODIPINE BESYLATE 10 MILLIGRAM(S): 2.5 TABLET ORAL at 06:29

## 2018-02-03 RX ADMIN — PANTOPRAZOLE SODIUM 40 MILLIGRAM(S): 20 TABLET, DELAYED RELEASE ORAL at 06:29

## 2018-02-03 RX ADMIN — NYSTATIN CREAM 1 APPLICATION(S): 100000 CREAM TOPICAL at 06:30

## 2018-02-03 RX ADMIN — Medication 0.1 MILLIGRAM(S): at 06:29

## 2018-02-03 RX ADMIN — Medication 3 MILLILITER(S): at 09:54

## 2018-02-03 RX ADMIN — Medication 30 MILLIGRAM(S): at 06:29

## 2018-02-03 RX ADMIN — Medication 100 MILLIGRAM(S): at 06:29

## 2018-02-03 RX ADMIN — LISINOPRIL 20 MILLIGRAM(S): 2.5 TABLET ORAL at 06:29

## 2018-02-03 RX ADMIN — Medication 3 MILLILITER(S): at 03:13

## 2018-02-03 NOTE — PROGRESS NOTE ADULT - SUBJECTIVE AND OBJECTIVE BOX
M E D I C A L   A T T E N D I N G    F O L L O W    U P   N O T E                                     ------------------------------------------------------------------------------------------------    patient evaluated by me, case discussed with team, chart, medications, and physical exam reviewed, labs / tests  and vitals reviewed by me, as mily.   Patient is stable for discharge today.  Patient to follow up with  PMD  See discharge document for full note.      ==================>> MEDICATIONS <<====================    ALBUTerol    90 MICROgram(s) HFA Inhaler 1 Puff(s) Inhalation every 4 hours  ALBUTerol/ipratropium for Nebulization 3 milliLiter(s) Nebulizer every 6 hours  amLODIPine   Tablet 10 milliGRAM(s) Oral daily  azithromycin  IVPB 500 milliGRAM(s) IV Intermittent every 24 hours  betamethasone valerate 0.1% Ointment 1 Application(s) Topical daily  cholecalciferol 1000 Unit(s) Oral daily  cloNIDine 0.1 milliGRAM(s) Oral two times a day  enoxaparin Injectable 40 milliGRAM(s) SubCutaneous daily  insulin lispro (HumaLOG) corrective regimen sliding scale   SubCutaneous three times a day before meals  lisinopril 20 milliGRAM(s) Oral two times a day  metoprolol succinate  milliGRAM(s) Oral daily  nystatin Powder 1 Application(s) Topical three times a day  oseltamivir 30 milliGRAM(s) Oral two times a day  pantoprazole    Tablet 40 milliGRAM(s) Oral before breakfast  sodium chloride 0.9%. 1000 milliLiter(s) IV Continuous <Continuous>  tiotropium 18 MICROgram(s) Capsule 1 Capsule(s) Inhalation daily    MEDICATIONS  (PRN):  acetaminophen   Tablet 650 milliGRAM(s) Oral every 6 hours PRN For Temp greater than 38 C (100.4 F)  benzocaine 15 mG/menthol 3.6 mG Lozenge 1 Lozenge Oral every 4 hours PRN Sore Throat  guaiFENesin    Syrup 100 milliGRAM(s) Oral every 6 hours PRN Cough  ibuprofen  Tablet 200 milliGRAM(s) Oral every 6 hours PRN Moderate pain 4-6  traMADol 50 milliGRAM(s) Oral three times a day PRN Severe Pain (7 - 10)    ==================>> VITAL SIGNS <<==================    T(C): 36.4 (02-03-18 @ 05:17), Max: 36.7 (02-02-18 @ 20:46)  HR: 77 (02-03-18 @ 05:17) (70 - 77)  BP: 131/62 (02-03-18 @ 05:17) (121/65 - 131/62)  RR: 17 (02-03-18 @ 05:17) (16 - 17)  SpO2: 98% (02-03-18 @ 05:17) (97% - 98%)  Wt(kg): --   CAPILLARY BLOOD GLUCOSE      POCT Blood Glucose.: 126 mg/dL (03 Feb 2018 07:59)  POCT Blood Glucose.: 128 mg/dL (02 Feb 2018 21:18)  POCT Blood Glucose.: 122 mg/dL (02 Feb 2018 16:05)  POCT Blood Glucose.: 206 mg/dL (02 Feb 2018 11:29)  I&O's Summary     ==================>> LAB AND IMAGING <<==================                        15.1   6.8   )-----------( 163      ( 02 Feb 2018 07:01 )             46.8        02-02    139  |  102  |  36<H>  ----------------------------<  107<H>  3.6   |  26  |  1.11    Ca    9.1      02 Feb 2018 07:01  Phos  4.2     02-02  Mg     2.3     02-02       TSH:      2.65   (01-31-18)           Lipid profile:  (01-31-18)     Total: 153     LDL  : 93     HDL  :28     TG   :162     HgA1C: 6.6  (01-31-18)
INTERVAL HPI/OVERNIGHT EVENTS: pt seen and examined. Pt looks better today and sob improved..     VITAL SIGNS:  Vital Signs Last 24 Hrs  T(C): 36.9 (02 Feb 2018 05:24), Max: 36.9 (02 Feb 2018 05:24)  T(F): 98.5 (02 Feb 2018 05:24), Max: 98.5 (02 Feb 2018 05:24)  HR: 78 (02 Feb 2018 05:24) (53 - 84)  BP: 118/77 (02 Feb 2018 05:24) (111/59 - 126/66)  BP(mean): --  RR: 18 (02 Feb 2018 05:24) (17 - 18)  SpO2: 99% (02 Feb 2018 05:24) (94% - 99%)  PHYSICAL EXAM:    Constitutional: NAD  Respiratory: CTAB  Cardiovascular: RRR, S1, S2 present  Gastrointestinal: ND, NT, BS++  Extremities: No pedal edema, b/l feet ulcers  Neurological: AAOX3, non focal, anxious  Musculoskeletal: power equal in all extremities    MEDICATIONS  (STANDING):  ALBUTerol    90 MICROgram(s) HFA Inhaler 1 Puff(s) Inhalation every 4 hours  ALBUTerol/ipratropium for Nebulization 3 milliLiter(s) Nebulizer every 6 hours  azithromycin  IVPB 500 milliGRAM(s) IV Intermittent every 24 hours  betamethasone valerate 0.1% Ointment 1 Application(s) Topical daily  cholecalciferol 1000 Unit(s) Oral daily  cloNIDine 0.1 milliGRAM(s) Oral two times a day  enoxaparin Injectable 40 milliGRAM(s) SubCutaneous daily  insulin lispro (HumaLOG) corrective regimen sliding scale   SubCutaneous three times a day before meals  lisinopril 20 milliGRAM(s) Oral two times a day  metoprolol succinate  milliGRAM(s) Oral daily  nystatin Powder 1 Application(s) Topical three times a day  oseltamivir 30 milliGRAM(s) Oral two times a day  pantoprazole    Tablet 40 milliGRAM(s) Oral before breakfast  sodium chloride 0.9%. 1000 milliLiter(s) (60 mL/Hr) IV Continuous <Continuous>  tiotropium 18 MICROgram(s) Capsule 1 Capsule(s) Inhalation daily    MEDICATIONS  (PRN):  acetaminophen   Tablet 650 milliGRAM(s) Oral every 6 hours PRN For Temp greater than 38 C (100.4 F)  benzocaine 15 mG/menthol 3.6 mG Lozenge 1 Lozenge Oral every 4 hours PRN Sore Throat  guaiFENesin    Syrup 100 milliGRAM(s) Oral every 6 hours PRN Cough  ibuprofen  Tablet 200 milliGRAM(s) Oral every 6 hours PRN Moderate pain 4-6  traMADol 50 milliGRAM(s) Oral three times a day PRN Severe Pain (7 - 10)      Allergies    No Known Allergies    Intolerances    Labs:   Complete Blood Count (02.02.18 @ 07:01)    WBC Count: 6.8 K/uL    RBC Count: 4.99 M/uL    Hemoglobin: 15.1 g/dL    Hematocrit: 46.8 %    Mean Cell Volume: 93.8 fl    Mean Cell Hemoglobin: 30.2 pg    Mean Cell Hemoglobin Conc: 32.2 gm/dL    Red Cell Distrib Width: 12.2 %    Platelet Count - Automated: 163 K/uL    Basic Metabolic Panel (02.02.18 @ 07:01)    Sodium, Serum: 139 mmol/L    Potassium, Serum: 3.6 mmol/L    Chloride, Serum: 102 mmol/L    Carbon Dioxide, Serum: 26 mmol/L    Anion Gap, Serum: 11 mmol/L    Blood Urea Nitrogen, Serum: 36 mg/dL    Creatinine, Serum: 1.11 mg/dL    Glucose, Serum: 107 mg/dL    Calcium, Total Serum: 9.1 mg/dL    eGFR if Non : 47: Interpretative comment  The units for eGFR are ml/min/1.73m2 (normalized body surface area). The  eGFR is calculated from a serum creatinine using the CKD-EPI equation.  Other variables required for calculation are race, age and sex. Among  patients with chronic kidney disease (CKD), the eGFR is useful in  determining the stage of disease according to KDOQI CKD classification.  All eGFR results are reported numerically with the following  interpretation.          GFR                    With                 Without     (ml/min/1.73 m2)    Kidney Damage       Kidney Damage        >= 90                    Stage 1                     Normal        60-89                    Stage 2                     Decreased GFR        30-59     Stage 3                     Stage 3        15-29                    Stage 4                     Stage 4        < 15                      Stage 5                     Stage 5  Each stage of CKD assumes that the associated GFR level has been in  effect for at least 3 months. Determination of stages one and two (with  eGFR > 59 ml/min/m2) requires estimation of kidney damage for at least 3  months as defined by structural or functional abnormalities.  Limitations: All estimates of GFR will be less accurate for patients at  extremes of muscle mass (including but not limited to frail elderly,  critically ill, or cancer patients), those with unusual diets, and those  with conditions associated with reduced secretion or extrarenal  elimination of creatinine. The eGFR equation is not recommended for use  in patients with unstable creatinine levels. mL/min/1.73M2    eGFR if African American: 54 mL/min/1.73M2        RADIOLOGY & ADDITIONAL TESTS:  < from: CT Chest No Cont (01.31.18 @ 07:36) >  A few right upper lobe nodules measure up to 4 mm, some which are   clustered. The clustered nodules may represent mucoid impaction,   infectious, or inflammatory. No lung consolidation. One-year follow-up CT   recommended if there are risk factors for malignancy.    < end of copied text >
INTERVAL HPI/OVERNIGHT EVENTS: pt seen and examined. Pt looks weak and anxious.     VITAL SIGNS:  T(F): 97.5 (02-01-18 @ 05:25)  HR: 85 (02-01-18 @ 05:25)  BP: 141/81 (02-01-18 @ 05:25)  RR: 17 (02-01-18 @ 05:25)  SpO2: 96% (02-01-18 @ 05:25)  Wt(kg): --    PHYSICAL EXAM:    Constitutional: NAD  Respiratory: CTAB  Cardiovascular: RRR, S1, S2 present  Gastrointestinal: ND, NT, BS++  Extremities: No pedal edema  Neurological: AAOX3, non focal, anxious  Musculoskeletal: power equal in all extremities    MEDICATIONS  (STANDING):  ALBUTerol    90 MICROgram(s) HFA Inhaler 1 Puff(s) Inhalation every 4 hours  ALBUTerol/ipratropium for Nebulization 3 milliLiter(s) Nebulizer every 6 hours  azithromycin  IVPB 500 milliGRAM(s) IV Intermittent every 24 hours  betamethasone valerate 0.1% Ointment 1 Application(s) Topical daily  cholecalciferol 1000 Unit(s) Oral daily  cloNIDine 0.1 milliGRAM(s) Oral two times a day  enoxaparin Injectable 40 milliGRAM(s) SubCutaneous daily  insulin lispro (HumaLOG) corrective regimen sliding scale   SubCutaneous three times a day before meals  lisinopril 20 milliGRAM(s) Oral two times a day  metoprolol succinate  milliGRAM(s) Oral daily  nystatin Powder 1 Application(s) Topical three times a day  oseltamivir 30 milliGRAM(s) Oral two times a day  pantoprazole    Tablet 40 milliGRAM(s) Oral before breakfast  sodium chloride 0.9%. 1000 milliLiter(s) (60 mL/Hr) IV Continuous <Continuous>  tiotropium 18 MICROgram(s) Capsule 1 Capsule(s) Inhalation daily    MEDICATIONS  (PRN):  acetaminophen   Tablet 650 milliGRAM(s) Oral every 6 hours PRN For Temp greater than 38 C (100.4 F)  benzocaine 15 mG/menthol 3.6 mG Lozenge 1 Lozenge Oral every 4 hours PRN Sore Throat  guaiFENesin    Syrup 100 milliGRAM(s) Oral every 6 hours PRN Cough  ibuprofen  Tablet 200 milliGRAM(s) Oral every 6 hours PRN Moderate pain 4-6  traMADol 50 milliGRAM(s) Oral three times a day PRN Severe Pain (7 - 10)      Allergies    No Known Allergies    Intolerances        LABS:                        15.2   6.4   )-----------( 143      ( 01 Feb 2018 06:31 )             47.4     02-01    137  |  101  |  20<H>  ----------------------------<  109<H>  4.0   |  28  |  0.85    Ca    8.9      01 Feb 2018 06:31  Phos  3.2     01-31  Mg     1.9     01-31    TPro  8.1  /  Alb  3.1<L>  /  TBili  0.5  /  DBili  x   /  AST  43<H>  /  ALT  34  /  AlkPhos  66  02-01          RADIOLOGY & ADDITIONAL TESTS:  < from: CT Chest No Cont (01.31.18 @ 07:36) >  A few right upper lobe nodules measure up to 4 mm, some which are   clustered. The clustered nodules may represent mucoid impaction,   infectious, or inflammatory. No lung consolidation. One-year follow-up CT   recommended if there are risk factors for malignancy.    < end of copied text >
_________________________________________________________________________________________  ========>>  M E D I C A L   A T T E N D I N G    F O L L O W  U P  N O T E  <<=========  -----------------------------------------------------------------------------------------------------    - Patient seen and examined by me earlier today.  - In summary, patient is a 82y year old woman who originally presented with cough, fever  - Patient today overall doing better, comfortable, eating better    ==================>> REVIEW OF SYSTEM <<=================    GEN: no fever, no chills, no pain, generally improved   RESP: SOB and cough stable , no sputum  CVS: no chest pain, no palpitations, no edema  GI: no abdominal pain, no nausea, no constipation, no diarrhea  : no dysuria, no frequency, no hematuria  Neuro: no headache, no dizziness  Derm : no itching, no rash    ==================>> PHYSICAL EXAM <<=================    GEN: Alert and responsive/oriented , NAD , comfortable  HEENT: NCAT, PERRL, MMM, hearing intact  Neck: supple , no JVD  CVS: S1S2 , regular , No M/R/G appreciated  PULM: scattered mild ronchi, improved  ABD.: soft. non tender, non distended,  bowel sounds present  Extrem: intact pulses , no edema   PSYCH : normal mood,  not anxious      ==================>> MEDICATIONS <<====================    ALBUTerol    90 MICROgram(s) HFA Inhaler 1 Puff(s) Inhalation every 4 hours  ALBUTerol/ipratropium for Nebulization 3 milliLiter(s) Nebulizer every 6 hours  amLODIPine   Tablet 10 milliGRAM(s) Oral daily  azithromycin  IVPB 500 milliGRAM(s) IV Intermittent every 24 hours  betamethasone valerate 0.1% Ointment 1 Application(s) Topical daily  cholecalciferol 1000 Unit(s) Oral daily  cloNIDine 0.1 milliGRAM(s) Oral two times a day  enoxaparin Injectable 40 milliGRAM(s) SubCutaneous daily  insulin lispro (HumaLOG) corrective regimen sliding scale   SubCutaneous three times a day before meals  lisinopril 20 milliGRAM(s) Oral two times a day  metoprolol succinate  milliGRAM(s) Oral daily  nystatin Powder 1 Application(s) Topical three times a day  oseltamivir 30 milliGRAM(s) Oral two times a day  pantoprazole    Tablet 40 milliGRAM(s) Oral before breakfast  sodium chloride 0.9%. 1000 milliLiter(s) IV Continuous <Continuous>  tiotropium 18 MICROgram(s) Capsule 1 Capsule(s) Inhalation daily    MEDICATIONS  (PRN):  acetaminophen   Tablet 650 milliGRAM(s) Oral every 6 hours PRN For Temp greater than 38 C (100.4 F)  benzocaine 15 mG/menthol 3.6 mG Lozenge 1 Lozenge Oral every 4 hours PRN Sore Throat  guaiFENesin    Syrup 100 milliGRAM(s) Oral every 6 hours PRN Cough  ibuprofen  Tablet 200 milliGRAM(s) Oral every 6 hours PRN Moderate pain 4-6  traMADol 50 milliGRAM(s) Oral three times a day PRN Severe Pain (7 - 10)    ==================>> VITAL SIGNS <<==================    Vital Signs Last 24 Hrs  T(C): 36.6 (02-02-18 @ 14:26)  T(F): 97.9 (02-02-18 @ 14:26), Max: 98.5 (02-02-18 @ 05:24)  HR: 77 (02-02-18 @ 14:26) (74 - 84)  BP: 121/65 (02-02-18 @ 14:26)  BP(mean): --  RR: 17 (02-02-18 @ 14:26) (17 - 18)  SpO2: 98% (02-02-18 @ 14:26) (98% - 99%)      POCT Blood Glucose.: 122 mg/dL (02 Feb 2018 16:05)  POCT Blood Glucose.: 206 mg/dL (02 Feb 2018 11:29)  POCT Blood Glucose.: 115 mg/dL (02 Feb 2018 08:04)  POCT Blood Glucose.: 123 mg/dL (01 Feb 2018 21:46)     ==================>> LAB AND IMAGING <<==================                        15.1   6.8   )-----------( 163      ( 02 Feb 2018 07:01 )             46.8        02-02    139  |  102  |  36<H>  ----------------------------<  107<H>  3.6   |  26  |  1.11    Ca    9.1      02 Feb 2018 07:01  Phos  4.2     02-02  Mg     2.3     02-02    TPro  8.1  /  Alb  3.1<L>  /  TBili  0.5  /  DBili  x   /  AST  43<H>  /  ALT  34  /  AlkPhos  66  02-01    < from: CT Chest No Cont (01.31.18 @ 07:36) >  IMPRESSION:  A few right upper lobe nodules measure up to 4 mm, some which are   clustered. The clustered nodules may represent mucoid impaction,   infectious, or inflammatory. No lung consolidation. One-year follow-up CT   recommended if there are risk factors for malignancy.  < end of copied text >    ___________________________________________________________________________________  ===============>>  A S S E S S M E N T   A N D   P L A N <<===============  ------------------------------------------------------------------------------------------    · Assessment		  Patient is a 83 y/o F pt with PMhx of HTN, arthritis, lichen planus in [facility] (uses topical steroids, daughter does not know the name of ointment, will bring in the tube tomorrow), lives with daughter and walks with a walker, AAO*3 at baseline,  presents to ED c/o flu-like symptoms.  Admitted for the flu and possible early PNA    Problem/Plan - 1:  ·  Problem: viral URI / influenza with possible early bacterial superinfection/ Pneumonia  continue Tamiflu and azithromycin for 5 days total for both  O2, nebs, Robitussin, Cepacol.   supportive care  pt can not go home today per Dtr: to go home tomorrow     Problem/Plan - 2:  ·  Problem: Hypertension.  Plan: c/w home dose of clonidine, Torpol XL and lisinopril with parameters  DASH diet.     -GI/DVT Prophylaxis.  DC plan as above  --------------------------------------------  Case discussed with pt, Dtr, RN  Education given on plan of care, nutrition / hydration    ___________________________  HTamy Gentile D.O.  Pager: [phone]
_________________________________________________________________________________________  ========>>  M E D I C A L   A T T E N D I N G    F O L L O W  U P  N O T E  <<=========  -----------------------------------------------------------------------------------------------------    - Patient seen and examined by me earlier today.  - In summary, patient is a 82y year old woman who originally presented with cough, fever  - Patient today overall doing ok, comfortable, eating better.     ==================>> REVIEW OF SYSTEM <<=================    GEN: no fever, no chills, no pain, generally weak  RESP: SOB and cough improved , no sputum  CVS: no chest pain, no palpitations, no edema  GI: no abdominal pain, no nausea, no constipation, no diarrhea  : no dysuria, no frequency, no hematuria  Neuro: no headache, no dizziness  Derm : no itching, no rash    ==================>> PHYSICAL EXAM <<=================    GEN: A&O X 3 , NAD , comfortable  HEENT: NCAT, PERRL, MMM, hearing intact  Neck: supple , no JVD  CVS: S1S2 , regular , No M/R/G appreciated  PULM: scattered mild ronchi  ABD.: soft. non tender, non distended,  bowel sounds present  Extrem: intact pulses , no edema   PSYCH : normal mood,  not anxious      ==================>> MEDICATIONS <<====================    MEDICATIONS  (STANDING):  ALBUTerol    90 MICROgram(s) HFA Inhaler 1 Puff(s) Inhalation every 4 hours  ALBUTerol/ipratropium for Nebulization 3 milliLiter(s) Nebulizer every 6 hours  azithromycin  IVPB 500 milliGRAM(s) IV Intermittent every 24 hours  betamethasone valerate 0.1% Ointment 1 Application(s) Topical daily  cloNIDine 0.1 milliGRAM(s) Oral two times a day  enoxaparin Injectable 40 milliGRAM(s) SubCutaneous daily  insulin lispro (HumaLOG) corrective regimen sliding scale   SubCutaneous three times a day before meals  lisinopril 20 milliGRAM(s) Oral two times a day  metoprolol succinate  milliGRAM(s) Oral daily  oseltamivir 30 milliGRAM(s) Oral two times a day  pantoprazole    Tablet 40 milliGRAM(s) Oral before breakfast  sodium chloride 0.9%. 1000 milliLiter(s) (60 mL/Hr) IV Continuous <Continuous>  tiotropium 18 MICROgram(s) Capsule 1 Capsule(s) Inhalation daily    MEDICATIONS  (PRN):  acetaminophen   Tablet 650 milliGRAM(s) Oral every 6 hours PRN For Temp greater than 38 C (100.4 F)  benzocaine 15 mG/menthol 3.6 mG Lozenge 1 Lozenge Oral every 4 hours PRN Sore Throat  guaiFENesin    Syrup 100 milliGRAM(s) Oral every 6 hours PRN Cough  ibuprofen  Tablet 200 milliGRAM(s) Oral every 6 hours PRN Moderate pain 4-6  traMADol 50 milliGRAM(s) Oral three times a day PRN Severe Pain (7 - 10)    ==================>> VITAL SIGNS <<==================    T(C): 36.7 (01-31-18 @ 12:25), Max: 36.8 (01-31-18 @ 00:17)  HR: 80 (01-31-18 @ 12:25) (51 - 80)  BP: 158/79 (01-31-18 @ 12:25) (158/79 - 170/73)  RR: 18 (01-31-18 @ 12:25) (17 - 18)  SpO2: 96% (01-31-18 @ 12:25) (96% - 100%)    POCT Blood Glucose.: 125 mg/dL (31 Jan 2018 16:47)  POCT Blood Glucose.: 138 mg/dL (31 Jan 2018 12:03)  POCT Blood Glucose.: 101 mg/dL (31 Jan 2018 08:08)     ==================>> LAB AND IMAGING <<==================                        15.2   10.0  )-----------( 162      ( 31 Jan 2018 05:55 )             47.2        01-31    137  |  100  |  21<H>  ----------------------------<  109<H>  3.9   |  26  |  0.89    Ca    8.5      31 Jan 2018 05:55  Phos  3.2     01-31  Mg     1.9     01-31    TPro  8.2  /  Alb  3.3<L>  /  TBili  0.4  /  DBili  x   /  AST  25  /  ALT  16  /  AlkPhos  41  01-31     TSH:      2.65   (01-31-18)           Lipid profile:  (01-31-18)     Total: 153     LDL  : 93     HDL  :28     TG   :162     HgA1C: 6.6  (01-31-18)            < from: CT Chest No Cont (01.31.18 @ 07:36) >  IMPRESSION:  A few right upper lobe nodules measure up to 4 mm, some which are   clustered. The clustered nodules may represent mucoid impaction,   infectious, or inflammatory. No lung consolidation. One-year follow-up CT   recommended if there are risk factors for malignancy.  < end of copied text >    ___________________________________________________________________________________  ===============>>  A S S E S S M E N T   A N D   P L A N <<===============  ------------------------------------------------------------------------------------------    · Assessment		  Patient is a 81 y/o F pt with PMhx of HTN, arthritis, lichen planus in [facility] (uses topical steroids, daughter does not know the name of ointment, will bring in the tube tomorrow), lives with daughter and walks with a walker, AAO*3 at baseline,  presents to ED c/o flu-like symptoms.  Admitted for the flu and possible early PNA    Problem/Plan - 1:  ·  Problem: viral URI / influenza with possible early bacterial superinfection/ Pneumonia  continue Tamiflu and azithromycin  O2, nebs, Robitussin, Cepacol.   supportive care    Problem/Plan - 2:  ·  Problem: Hypertension.  Plan: c/w home dose of clonidine, Torpol XL and lisinopril with parameters  DASH diet.     -GI/DVT Prophylaxis.    --------------------------------------------  Case discussed with pt, RN  Education given on plan of care,   ___________________________  HTamy Gentile D.O.  Pager: [phone]
_________________________________________________________________________________________  ========>>  M E D I C A L   A T T E N D I N G    F O L L O W  U P  N O T E  <<=========  -----------------------------------------------------------------------------------------------------    - Patient seen and examined by me earlier today.  - In summary, patient is a 82y year old woman who originally presented with cough, fever  - Patient today overall doing ok, comfortable, eating poorly. weak     (of note, pt's  was admitted overnight with the flu as well)    ==================>> REVIEW OF SYSTEM <<=================    GEN: no fever, no chills, no pain, generally weak  RESP: SOB and cough stable , no sputum  CVS: no chest pain, no palpitations, no edema  GI: no abdominal pain, no nausea, no constipation, no diarrhea  : no dysuria, no frequency, no hematuria  Neuro: no headache, no dizziness  Derm : no itching, no rash    ==================>> PHYSICAL EXAM <<=================    GEN: Alert and responsive/oriented , NAD , comfortable  HEENT: NCAT, PERRL, MMM, hearing intact  Neck: supple , no JVD  CVS: S1S2 , regular , No M/R/G appreciated  PULM: scattered mild ronchi, improved  ABD.: soft. non tender, non distended,  bowel sounds present  Extrem: intact pulses , no edema   PSYCH : normal mood,  not anxious       ==================>> MEDICATIONS <<====================    ALBUTerol    90 MICROgram(s) HFA Inhaler 1 Puff(s) Inhalation every 4 hours  ALBUTerol/ipratropium for Nebulization 3 milliLiter(s) Nebulizer every 6 hours  azithromycin  IVPB 500 milliGRAM(s) IV Intermittent every 24 hours  betamethasone valerate 0.1% Ointment 1 Application(s) Topical daily  cholecalciferol 1000 Unit(s) Oral daily  cloNIDine 0.1 milliGRAM(s) Oral two times a day  enoxaparin Injectable 40 milliGRAM(s) SubCutaneous daily  insulin lispro (HumaLOG) corrective regimen sliding scale   SubCutaneous three times a day before meals  lisinopril 20 milliGRAM(s) Oral two times a day  metoprolol succinate  milliGRAM(s) Oral daily  nystatin Powder 1 Application(s) Topical three times a day  oseltamivir 30 milliGRAM(s) Oral two times a day  pantoprazole    Tablet 40 milliGRAM(s) Oral before breakfast  sodium chloride 0.9%. 1000 milliLiter(s) IV Continuous <Continuous>  tiotropium 18 MICROgram(s) Capsule 1 Capsule(s) Inhalation daily    MEDICATIONS  (PRN):  acetaminophen   Tablet 650 milliGRAM(s) Oral every 6 hours PRN For Temp greater than 38 C (100.4 F)  benzocaine 15 mG/menthol 3.6 mG Lozenge 1 Lozenge Oral every 4 hours PRN Sore Throat  guaiFENesin    Syrup 100 milliGRAM(s) Oral every 6 hours PRN Cough  ibuprofen  Tablet 200 milliGRAM(s) Oral every 6 hours PRN Moderate pain 4-6  traMADol 50 milliGRAM(s) Oral three times a day PRN Severe Pain (7 - 10)    ==================>> VITAL SIGNS <<==================    Vital Signs Last 24 Hrs  T(C): 36.4 (02-01-18 @ 05:25)  T(F): 97.5 (02-01-18 @ 05:25), Max: 98.9 (02-01-18 @ 00:26)  HR: 85 (02-01-18 @ 05:25) (68 - 85)  BP: 141/81 (02-01-18 @ 05:25)  BP(mean): --  RR: 17 (02-01-18 @ 05:25) (16 - 18)  SpO2: 96% (02-01-18 @ 05:25) (94% - 98%)      POCT Blood Glucose.: 175 mg/dL (01 Feb 2018 11:42)  POCT Blood Glucose.: 106 mg/dL (01 Feb 2018 07:35)  POCT Blood Glucose.: 123 mg/dL (31 Jan 2018 21:17)  POCT Blood Glucose.: 125 mg/dL (31 Jan 2018 16:47)     ==================>> LAB AND IMAGING <<==================                        15.2   6.4   )-----------( 143      ( 01 Feb 2018 06:31 )             47.4          137  |  101  |  20<H>  ----------------------------<  109<H>  4.0   |  28  |  0.85    Ca    8.9      01 Feb 2018 06:31  Phos  3.2     01-31  Mg     1.9     01-31    TPro  8.1  /  Alb  3.1<L>  /  TBili  0.5  /  DBili  x   /  AST  43<H>  /  ALT  34  /  AlkPhos  66  02-01    < from: CT Chest No Cont (01.31.18 @ 07:36) >  IMPRESSION:  A few right upper lobe nodules measure up to 4 mm, some which are   clustered. The clustered nodules may represent mucoid impaction,   infectious, or inflammatory. No lung consolidation. One-year follow-up CT   recommended if there are risk factors for malignancy.  < end of copied text >    ___________________________________________________________________________________  ===============>>  A S S E S S M E N T   A N D   P L A N <<===============  ------------------------------------------------------------------------------------------    · Assessment		  Patient is a 81 y/o F pt with PMhx of HTN, arthritis, lichen planus in [place] (uses topical steroids, daughter does not know the name of ointment, will bring in the tube tomorrow), lives with daughter and walks with a walker, AAO*3 at baseline,  presents to ED c/o flu-like symptoms.  Admitted for the flu and possible early PNA    Problem/Plan - 1:  ·  Problem: viral URI / influenza with possible early bacterial superinfection/ Pneumonia  continue Tamiflu and azithromycin  O2, nebs, Robitussin, Cepacol.   supportive care    Problem/Plan - 2:  ·  Problem: Hypertension.  Plan: c/w home dose of clonidine, Torpol XL and lisinopril with parameters  DASH diet.     -GI/DVT Prophylaxis.    --------------------------------------------  Case discussed with pt, Dtr, HS  Education given on plan of care, nutrition / hydration    ___________________________  H. KEO Gentile.  Pager: [phone]

## 2018-02-05 LAB
CULTURE RESULTS: SIGNIFICANT CHANGE UP
CULTURE RESULTS: SIGNIFICANT CHANGE UP
SPECIMEN SOURCE: SIGNIFICANT CHANGE UP
SPECIMEN SOURCE: SIGNIFICANT CHANGE UP

## 2018-03-22 ENCOUNTER — APPOINTMENT (OUTPATIENT)
Dept: INTERNAL MEDICINE | Facility: CLINIC | Age: 83
End: 2018-03-22

## 2018-04-09 ENCOUNTER — APPOINTMENT (OUTPATIENT)
Dept: INTERNAL MEDICINE | Facility: CLINIC | Age: 83
End: 2018-04-09
Payer: MEDICARE

## 2018-04-09 VITALS
RESPIRATION RATE: 16 BRPM | BODY MASS INDEX: 31.85 KG/M2 | HEART RATE: 86 BPM | SYSTOLIC BLOOD PRESSURE: 130 MMHG | WEIGHT: 158 LBS | TEMPERATURE: 98.2 F | OXYGEN SATURATION: 96 % | HEIGHT: 59 IN | DIASTOLIC BLOOD PRESSURE: 80 MMHG

## 2018-04-09 DIAGNOSIS — I10 ESSENTIAL (PRIMARY) HYPERTENSION: ICD-10-CM

## 2018-04-09 DIAGNOSIS — Z82.61 FAMILY HISTORY OF ARTHRITIS: ICD-10-CM

## 2018-04-09 DIAGNOSIS — Z80.9 FAMILY HISTORY OF MALIGNANT NEOPLASM, UNSPECIFIED: ICD-10-CM

## 2018-04-09 DIAGNOSIS — E66.9 OBESITY, UNSPECIFIED: ICD-10-CM

## 2018-04-09 DIAGNOSIS — K21.9 GASTRO-ESOPHAGEAL REFLUX DISEASE W/OUT ESOPHAGITIS: ICD-10-CM

## 2018-04-09 DIAGNOSIS — L08.9 LOCAL INFECTION OF THE SKIN AND SUBCUTANEOUS TISSUE, UNSPECIFIED: ICD-10-CM

## 2018-04-09 DIAGNOSIS — K63.9 DISEASE OF INTESTINE, UNSPECIFIED: ICD-10-CM

## 2018-04-09 PROCEDURE — 99204 OFFICE O/P NEW MOD 45 MIN: CPT

## 2018-04-10 PROBLEM — E66.9 OBESITY (BMI 30.0-34.9): Status: ACTIVE | Noted: 2018-04-09

## 2018-04-10 PROBLEM — K21.9 CHRONIC GERD: Status: ACTIVE | Noted: 2018-04-09

## 2018-04-10 RX ORDER — TRAMADOL HYDROCHLORIDE 25 MG/1
TABLET, COATED ORAL
Refills: 0 | Status: DISCONTINUED | COMMUNITY
End: 2018-04-10

## 2018-04-10 RX ORDER — ASPIRIN 81 MG/1
81 TABLET ORAL
Qty: 30 | Refills: 0 | Status: DISCONTINUED | COMMUNITY
Start: 2017-09-13 | End: 2018-04-10

## 2018-04-10 RX ORDER — ALENDRONATE SODIUM 70 MG/75ML
SOLUTION ORAL
Refills: 0 | Status: DISCONTINUED | COMMUNITY
End: 2018-04-10

## 2018-04-10 RX ORDER — ASPIRIN ENTERIC COATED TABLETS 81 MG 81 MG/1
81 TABLET, DELAYED RELEASE ORAL DAILY
Qty: 30 | Refills: 5 | Status: ACTIVE | COMMUNITY
Start: 2018-04-10

## 2018-04-10 RX ORDER — PANTOPRAZOLE 20 MG/1
20 TABLET, DELAYED RELEASE ORAL
Refills: 0 | Status: ACTIVE | COMMUNITY
Start: 2018-04-10

## 2018-04-10 RX ORDER — METOPROLOL TARTRATE 100 MG/1
100 TABLET, FILM COATED ORAL DAILY
Qty: 30 | Refills: 0 | Status: ACTIVE | COMMUNITY

## 2018-04-10 RX ORDER — ERGOCALCIFEROL 1.25 MG/1
1.25 MG CAPSULE, LIQUID FILLED ORAL
Qty: 4 | Refills: 0 | Status: DISCONTINUED | COMMUNITY
Start: 2017-09-13 | End: 2018-04-10

## 2018-04-10 RX ORDER — ASPIRIN 81 MG
81 TABLET, DELAYED RELEASE (ENTERIC COATED) ORAL DAILY
Qty: 30 | Refills: 2 | Status: DISCONTINUED | COMMUNITY
End: 2018-04-10

## 2018-04-10 RX ORDER — CLONIDINE HYDROCHLORIDE 0.1 MG/1
0.1 TABLET ORAL TWICE DAILY
Qty: 60 | Refills: 0 | Status: ACTIVE | COMMUNITY

## 2018-04-17 ENCOUNTER — MEDICATION RENEWAL (OUTPATIENT)
Age: 83
End: 2018-04-17

## 2018-05-04 ENCOUNTER — LABORATORY RESULT (OUTPATIENT)
Age: 83
End: 2018-05-04

## 2018-05-06 LAB
ALBUMIN SERPL ELPH-MCNC: 4 G/DL
ALP BLD-CCNC: 57 U/L
ALT SERPL-CCNC: 6 U/L
ANION GAP SERPL CALC-SCNC: 15 MMOL/L
APPEARANCE: CLEAR
AST SERPL-CCNC: 10 U/L
BACTERIA: NEGATIVE
BASOPHILS # BLD AUTO: 0.09 K/UL
BASOPHILS NFR BLD AUTO: 0.8 %
BILIRUB SERPL-MCNC: 0.3 MG/DL
BILIRUBIN URINE: NEGATIVE
BLOOD URINE: ABNORMAL
BUN SERPL-MCNC: 25 MG/DL
CALCIUM SERPL-MCNC: 10.2 MG/DL
CHLORIDE SERPL-SCNC: 107 MMOL/L
CHOLEST SERPL-MCNC: 187 MG/DL
CHOLEST/HDLC SERPL: 6.7 RATIO
CO2 SERPL-SCNC: 22 MMOL/L
COLOR: YELLOW
CREAT SERPL-MCNC: 1.27 MG/DL
EOSINOPHIL # BLD AUTO: 0.73 K/UL
EOSINOPHIL NFR BLD AUTO: 6.9 %
ESTIMATED AVERAGE GLUCOSE: 143 MG/DL
GLUCOSE QUALITATIVE U: NEGATIVE MG/DL
GLUCOSE SERPL-MCNC: 139 MG/DL
HBA1C MFR BLD HPLC: 6.6 %
HCT VFR BLD CALC: 40.4 %
HDLC SERPL-MCNC: 28 MG/DL
HGB BLD-MCNC: 13.2 G/DL
HYALINE CASTS: 7 /LPF
IMM GRANULOCYTES NFR BLD AUTO: 0.8 %
KETONES URINE: NEGATIVE
LDLC SERPL CALC-MCNC: NORMAL
LEUKOCYTE ESTERASE URINE: ABNORMAL
LYMPHOCYTES # BLD AUTO: 3.01 K/UL
LYMPHOCYTES NFR BLD AUTO: 28.4 %
MAN DIFF?: NORMAL
MCHC RBC-ENTMCNC: 30.8 PG
MCHC RBC-ENTMCNC: 32.7 GM/DL
MCV RBC AUTO: 94.4 FL
MICROSCOPIC-UA: NORMAL
MONOCYTES # BLD AUTO: 0.87 K/UL
MONOCYTES NFR BLD AUTO: 8.2 %
NEUTROPHILS # BLD AUTO: 5.82 K/UL
NEUTROPHILS NFR BLD AUTO: 54.9 %
NITRITE URINE: NEGATIVE
PH URINE: 5.5
PLATELET # BLD AUTO: 230 K/UL
POTASSIUM SERPL-SCNC: 4.3 MMOL/L
PROT SERPL-MCNC: 8 G/DL
PROTEIN URINE: ABNORMAL MG/DL
RBC # BLD: 4.28 M/UL
RBC # FLD: 15.5 %
RED BLOOD CELLS URINE: 4 /HPF
SODIUM SERPL-SCNC: 144 MMOL/L
SPECIFIC GRAVITY URINE: 1.02
SQUAMOUS EPITHELIAL CELLS: 2 /HPF
TRIGL SERPL-MCNC: 413 MG/DL
UREA BREATH TEST QL: NEGATIVE
UROBILINOGEN URINE: NEGATIVE MG/DL
WBC # FLD AUTO: 10.6 K/UL
WHITE BLOOD CELLS URINE: 32 /HPF

## 2018-05-09 LAB — 25(OH)D3 SERPL-MCNC: 17.7 NG/ML

## 2018-05-15 ENCOUNTER — RX RENEWAL (OUTPATIENT)
Age: 83
End: 2018-05-15

## 2018-05-15 RX ORDER — IBUPROFEN 600 MG/1
600 TABLET, FILM COATED ORAL
Qty: 90 | Refills: 0 | Status: DISCONTINUED | COMMUNITY
Start: 2018-04-09 | End: 2018-05-15

## 2018-05-24 ENCOUNTER — APPOINTMENT (OUTPATIENT)
Dept: INTERNAL MEDICINE | Facility: CLINIC | Age: 83
End: 2018-05-24
Payer: MEDICARE

## 2018-05-24 VITALS
RESPIRATION RATE: 16 BRPM | HEIGHT: 59 IN | OXYGEN SATURATION: 96 % | DIASTOLIC BLOOD PRESSURE: 85 MMHG | WEIGHT: 164 LBS | HEART RATE: 66 BPM | TEMPERATURE: 98.7 F | SYSTOLIC BLOOD PRESSURE: 158 MMHG | BODY MASS INDEX: 33.06 KG/M2

## 2018-05-24 VITALS — DIASTOLIC BLOOD PRESSURE: 70 MMHG | SYSTOLIC BLOOD PRESSURE: 140 MMHG

## 2018-05-24 DIAGNOSIS — E55.9 VITAMIN D DEFICIENCY, UNSPECIFIED: ICD-10-CM

## 2018-05-24 DIAGNOSIS — H10.9 UNSPECIFIED CONJUNCTIVITIS: ICD-10-CM

## 2018-05-24 DIAGNOSIS — Z78.9 OTHER SPECIFIED HEALTH STATUS: ICD-10-CM

## 2018-05-24 DIAGNOSIS — M54.9 DORSALGIA, UNSPECIFIED: ICD-10-CM

## 2018-05-24 DIAGNOSIS — E11.9 TYPE 2 DIABETES MELLITUS W/OUT COMPLICATIONS: ICD-10-CM

## 2018-05-24 DIAGNOSIS — G89.29 DORSALGIA, UNSPECIFIED: ICD-10-CM

## 2018-05-24 DIAGNOSIS — H26.9 UNSPECIFIED CATARACT: ICD-10-CM

## 2018-05-24 DIAGNOSIS — M81.0 AGE-RELATED OSTEOPOROSIS W/OUT CURRENT PATHOLOGICAL FRACTURE: ICD-10-CM

## 2018-05-24 DIAGNOSIS — R25.1 TREMOR, UNSPECIFIED: ICD-10-CM

## 2018-05-24 DIAGNOSIS — Z00.00 ENCOUNTER FOR GENERAL ADULT MEDICAL EXAMINATION W/OUT ABNORMAL FINDINGS: ICD-10-CM

## 2018-05-24 DIAGNOSIS — E78.1 PURE HYPERGLYCERIDEMIA: ICD-10-CM

## 2018-05-24 PROCEDURE — 99214 OFFICE O/P EST MOD 30 MIN: CPT

## 2018-05-24 RX ORDER — GANCICLOVIR 1.5 MG/G
0.15 GEL OPHTHALMIC
Qty: 5 | Refills: 0 | Status: ACTIVE | COMMUNITY
Start: 2018-04-26

## 2018-05-24 RX ORDER — ERYTHROMYCIN 5 MG/G
5 OINTMENT OPHTHALMIC
Qty: 4 | Refills: 0 | Status: ACTIVE | COMMUNITY
Start: 2018-04-26

## 2018-05-24 RX ORDER — MV-MN/OM3/DHA/EPA/FISH/LUT/ZEA 250-5-1 MG
CAPSULE ORAL
Qty: 30 | Refills: 0 | Status: ACTIVE | COMMUNITY
Start: 2018-05-03

## 2018-05-24 RX ORDER — VALACYCLOVIR 1 G/1
1 TABLET, FILM COATED ORAL
Qty: 28 | Refills: 0 | Status: ACTIVE | COMMUNITY
Start: 2018-04-26

## 2018-05-25 PROBLEM — E11.9 DIABETES MELLITUS, TYPE 2: Status: ACTIVE | Noted: 2018-05-24

## 2018-05-25 PROBLEM — E78.1 HYPERTRIGLYCERIDEMIA: Status: ACTIVE | Noted: 2018-05-24

## 2018-05-25 PROBLEM — M81.0 OSTEOPOROSIS, UNSPECIFIED OSTEOPOROSIS TYPE, UNSPECIFIED PATHOLOGICAL FRACTURE PRESENCE: Status: ACTIVE | Noted: 2018-04-09

## 2018-05-25 PROBLEM — H26.9 LEFT CATARACT: Status: ACTIVE | Noted: 2018-05-24

## 2018-05-25 PROBLEM — R25.1 TREMOR: Status: ACTIVE | Noted: 2018-04-09

## 2018-05-25 PROBLEM — M54.9 CHRONIC BACK PAIN: Status: ACTIVE | Noted: 2018-04-09

## 2018-05-25 PROBLEM — E55.9 VITAMIN D DEFICIENCY: Status: ACTIVE | Noted: 2018-05-25

## 2018-05-25 PROBLEM — Z78.9 NON-SMOKER: Noted: 2018-04-09

## 2018-05-25 PROBLEM — Z00.00 ENCOUNTER FOR PREVENTIVE HEALTH EXAMINATION: Status: ACTIVE | Noted: 2018-03-06

## 2018-05-25 RX ORDER — LISINOPRIL 20 MG/1
20 TABLET ORAL DAILY
Qty: 30 | Refills: 5 | Status: DISCONTINUED | COMMUNITY
End: 2018-05-25

## 2018-05-25 RX ORDER — GABAPENTIN 100 MG/1
100 CAPSULE ORAL
Qty: 180 | Refills: 0 | Status: ACTIVE | COMMUNITY
Start: 2018-05-25

## 2018-05-25 RX ORDER — IBUPROFEN 400 MG/1
400 TABLET, FILM COATED ORAL
Qty: 9 | Refills: 0 | Status: DISCONTINUED | COMMUNITY
Start: 2018-05-15 | End: 2018-05-25

## 2018-05-25 RX ORDER — GABAPENTIN 400 MG/1
400 CAPSULE ORAL 3 TIMES DAILY
Qty: 90 | Refills: 5 | Status: DISCONTINUED | COMMUNITY
End: 2018-05-25

## 2018-05-25 NOTE — REVIEW OF SYSTEMS
[Heartburn] : heartburn [Joint Pain] : joint pain [Back Pain] : back pain [Negative] : Heme/Lymph [FreeTextEntry3] : PER HPI [FreeTextEntry1] : Tremors

## 2018-05-25 NOTE — HEALTH RISK ASSESSMENT
[No falls in past year] : Patient reported no falls in the past year [0] : 2) Feeling down, depressed, or hopeless: Not at all (0) [] : No [de-identified] : P [de-identified] : Ophthalmology [[id number]] : 0

## 2018-05-25 NOTE — PHYSICAL EXAM
[No Acute Distress] : no acute distress [Well Nourished] : well nourished [Well Developed] : well developed [Well-Appearing] : well-appearing [Normal Outer Ear/Nose] : the outer ears and nose were normal in appearance [Supple] : supple [No Lymphadenopathy] : no lymphadenopathy [Clear to Auscultation] : lungs were clear to auscultation bilaterally [Normal Rate] : normal rate  [Regular Rhythm] : with a regular rhythm [Normal S1, S2] : normal S1 and S2 [No Edema] : there was no peripheral edema [Soft] : abdomen soft [Non Tender] : non-tender [Non-distended] : non-distended [No Masses] : no abdominal mass palpated [Normal Bowel Sounds] : normal bowel sounds [Normal Posterior Cervical Nodes] : no posterior cervical lymphadenopathy [Normal Anterior Cervical Nodes] : no anterior cervical lymphadenopathy [No Joint Swelling] : no joint swelling [Grossly Normal Strength/Tone] : grossly normal strength/tone [Coordination Grossly Intact] : coordination grossly intact [No Focal Deficits] : no focal deficits [Normal Affect] : the affect was normal [Normal Mood] : the mood was normal [de-identified] : left eye watery, no discharge [de-identified] : ambulates with a cane

## 2018-05-25 NOTE — HISTORY OF PRESENT ILLNESS
[Good understanding] : Patient has a good understanding of disease, goals and obesity follow-up plan [Weight Gain ___ Pounds] : Weight gain of [unfilled] pounds [None] : No weight lost attempts [Sedentary] : Patient is sedentary [Contemplation] : Contemplation: patient is considering to lose weight within the next 6 months, patient did not attempt to lose weight in the last year. [FreeTextEntry1] : HTN, Osteoporosis, Vitamin D deficiency, RA, GERD, Chronic LBP, Lichens Planus, Tremors [de-identified] : Ms. Jaime returns for medical follow-up today, accompanied by her daughter Letty who provides interpretation.  Last OV 4/9/18.  \par \par Since her last visit, patient developed an eye infection and is being treated by her ophthalmologist at Dr. Beasley's office with Erythromycin ointment and Valacyclovir.  Denies blurry vision, eye pain, headache. Denies chest pain, SOB, dizziness, weakness, palpitations, lightheadedness or syncope \par \par Test Results Reviewed  5/6/18:\par UA Trace blood\par Elev WBC, Eosinophils\par H. Pylori negative\par A1C 6.6\par GFR 40\par Lipid Panel:  Trig 415 unable to calculate LDL\par Vit D 17.7 [de-identified] : her daughter Letty says she will start cooking healthier

## 2018-05-25 NOTE — COUNSELING
[Healthy eating counseling provided] : healthy eating [Activity counseling provided] : activity [Low Fat Diet] : Low fat diet [Walking] : Walking [Good understanding] : Patient has a good understanding of lifestyle changes and the steps needed to achieve self management goals

## 2018-06-15 ENCOUNTER — OTHER (OUTPATIENT)
Age: 83
End: 2018-06-15

## 2018-06-15 DIAGNOSIS — N18.3 CHRONIC KIDNEY DISEASE, STAGE 3 (MODERATE): ICD-10-CM

## 2018-11-27 ENCOUNTER — OTHER (OUTPATIENT)
Age: 83
End: 2018-11-27

## 2018-12-21 ENCOUNTER — RX RENEWAL (OUTPATIENT)
Age: 83
End: 2018-12-21

## 2018-12-21 RX ORDER — ALENDRONATE SODIUM 70 MG/1
70 TABLET ORAL
Qty: 12 | Refills: 0 | Status: ACTIVE | COMMUNITY
Start: 2018-04-10 | End: 1900-01-01

## 2020-12-16 PROBLEM — H10.9 CONJUNCTIVITIS, LEFT EYE: Status: RESOLVED | Noted: 2018-05-24 | Resolved: 2020-12-16

## 2021-10-06 PROBLEM — I10 ESSENTIAL HYPERTENSION: Status: ACTIVE | Noted: 2018-04-09

## 2022-08-03 NOTE — ED ADULT NURSE NOTE - ED STAT RN HANDOFF DETAILS
Pt endorsed to RN Erlin. Pt alert and orientedx3. Pt not in any acute distress. Pt hemodynamically stable. Composite Graft Text: The defect edges were debeveled with a #15 scalpel blade.  Given the location of the defect, shape of the defect, the proximity to free margins and the fact the defect was full thickness a composite graft was deemed most appropriate.  The defect was outline and then transferred to the donor site.  A full thickness graft was then excised from the donor site. The graft was then placed in the primary defect, oriented appropriately and then sutured into place.  The secondary defect was then repaired using a primary closure.

## 2024-01-11 NOTE — PHYSICAL THERAPY INITIAL EVALUATION ADULT - SITTING BALANCE: DYNAMIC
Diagnoses and all orders for this visit:    Bacterial conjunctivitis  -     trimethoprim-polymyxin b (POLYTRIM) 19893-4.1 UNIT/ML-% ophthalmic solution; Place 1 drop into the right eye every 4 hours for 10 days       
good balance

## 2024-08-05 NOTE — ED ADULT TRIAGE NOTE - PRO INTERPRETER NEED 2
Patient is here alone today.    If any information or results need to be relayed from today's visit, the best way to contact the patient is via [phone] (mobile) - Patient gives verbal permission to leave a detailed voicemail at the number provided.         English

## 2024-09-12 NOTE — ED PROVIDER NOTE - NS_EDPROVIDERDISPOUSERTYPE_ED_A_ED
illicit drug related Scribe Attestation (For Scribes USE Only)... Attending Attestation (For Attendings USE Only).../Scribe Attestation (For Scribes USE Only)...

## 2024-09-16 NOTE — ED ADULT NURSE NOTE - PT NEEDS ASSIST
Provider: DR. MORENO    Caller: MISSY JOLLY    Relationship to Patient: SELF    Pharmacy:     Phone Number: [phone]     Reason for Call: FOLLOW UP -IUI    When was the patient last seen: 08.29.24    PATIENT CALLING TO SCHEDULE HER FOLLOW UP-IUI.    PATIENT STARTED HER FIRST CYCLE ON 09.14.24 AND NEEDS A FOLLOW UP ON THE 10TH DAY FOR A FOLLICLE CHECK    PATIENT CAN BE REACHED [phone]     PATIENT SAID YOU CAN REACH HER MYCHART    THANK YOU  
no